# Patient Record
Sex: MALE | Race: WHITE | Employment: OTHER | URBAN - METROPOLITAN AREA
[De-identification: names, ages, dates, MRNs, and addresses within clinical notes are randomized per-mention and may not be internally consistent; named-entity substitution may affect disease eponyms.]

---

## 2018-03-21 ENCOUNTER — OFFICE VISIT (OUTPATIENT)
Dept: HEMATOLOGY/ONCOLOGY | Facility: HOSPITAL | Age: 63
End: 2018-03-21
Attending: INTERNAL MEDICINE
Payer: COMMERCIAL

## 2018-03-21 VITALS
TEMPERATURE: 99 F | WEIGHT: 238.38 LBS | HEART RATE: 91 BPM | DIASTOLIC BLOOD PRESSURE: 81 MMHG | RESPIRATION RATE: 18 BRPM | SYSTOLIC BLOOD PRESSURE: 135 MMHG | HEIGHT: 75 IN | BODY MASS INDEX: 29.64 KG/M2 | OXYGEN SATURATION: 95 %

## 2018-03-21 DIAGNOSIS — C78.7 SECONDARY MALIGNANT NEOPLASM OF LIVER (HCC): ICD-10-CM

## 2018-03-21 DIAGNOSIS — C20 MALIGNANT NEOPLASM OF RECTUM (HCC): ICD-10-CM

## 2018-03-21 LAB
ALBUMIN SERPL-MCNC: 3.8 G/DL (ref 3.5–4.8)
ALP LIVER SERPL-CCNC: 58 U/L (ref 45–117)
ALT SERPL-CCNC: 37 U/L (ref 17–63)
AST SERPL-CCNC: 27 U/L (ref 15–41)
BASOPHILS # BLD AUTO: 0.02 X10(3) UL (ref 0–0.1)
BASOPHILS NFR BLD AUTO: 0.3 %
BILIRUB SERPL-MCNC: 0.4 MG/DL (ref 0.1–2)
BUN BLD-MCNC: 20 MG/DL (ref 8–20)
CALCIUM BLD-MCNC: 8.6 MG/DL (ref 8.3–10.3)
CEA: 2.4 NG/ML (ref 0.5–5)
CHLORIDE: 110 MMOL/L (ref 101–111)
CO2: 25 MMOL/L (ref 22–32)
CREAT BLD-MCNC: 0.92 MG/DL (ref 0.7–1.3)
EOSINOPHIL # BLD AUTO: 0.33 X10(3) UL (ref 0–0.3)
EOSINOPHIL NFR BLD AUTO: 5.3 %
ERYTHROCYTE [DISTWIDTH] IN BLOOD BY AUTOMATED COUNT: 12.4 % (ref 11.5–16)
GLUCOSE BLD-MCNC: 108 MG/DL (ref 70–99)
HCT VFR BLD AUTO: 46.9 % (ref 37–53)
HGB BLD-MCNC: 15.8 G/DL (ref 13–17)
IMMATURE GRANULOCYTE COUNT: 0.02 X10(3) UL (ref 0–1)
IMMATURE GRANULOCYTE RATIO %: 0.3 %
LDH: 209 U/L (ref 84–249)
LYMPHOCYTES # BLD AUTO: 0.79 X10(3) UL (ref 0.9–4)
LYMPHOCYTES NFR BLD AUTO: 12.6 %
M PROTEIN MFR SERPL ELPH: 6.9 G/DL (ref 6.1–8.3)
MCH RBC QN AUTO: 30.5 PG (ref 27–33.2)
MCHC RBC AUTO-ENTMCNC: 33.7 G/DL (ref 31–37)
MCV RBC AUTO: 90.5 FL (ref 80–99)
MONOCYTES # BLD AUTO: 0.55 X10(3) UL (ref 0.1–1)
MONOCYTES NFR BLD AUTO: 8.8 %
NEUTROPHIL ABS PRELIM: 4.56 X10 (3) UL (ref 1.3–6.7)
NEUTROPHILS # BLD AUTO: 4.56 X10(3) UL (ref 1.3–6.7)
NEUTROPHILS NFR BLD AUTO: 72.7 %
PLATELET # BLD AUTO: 129 10(3)UL (ref 150–450)
POTASSIUM SERPL-SCNC: 4.1 MMOL/L (ref 3.6–5.1)
RBC # BLD AUTO: 5.18 X10(6)UL (ref 4.3–5.7)
RED CELL DISTRIBUTION WIDTH-SD: 41.1 FL (ref 35.1–46.3)
SODIUM SERPL-SCNC: 141 MMOL/L (ref 136–144)
WBC # BLD AUTO: 6.3 X10(3) UL (ref 4–13)

## 2018-03-21 PROCEDURE — 99213 OFFICE O/P EST LOW 20 MIN: CPT | Performed by: INTERNAL MEDICINE

## 2018-03-21 NOTE — PROGRESS NOTES
Patient here for 1.5 year follow up. Patient denies any pain or any new issues.     Education Record    Learner:  Patient    Disease / Diagnosis: rectal with liver mets    Barriers / Limitations:  None   Comments:    Method:  Discussion   Comments:    Gener

## 2018-03-21 NOTE — PROGRESS NOTES
Cancer Center Progress Note    Problem List:      Patient Active Problem List:     Malignant neoplasm of rectum [facility])     Secondary malignant neoplasm of liver ([facility] [address]     Laceration of finger     Phalanx, distal fracture of finger      Interim History:    H consistent with healing zoster. Heart: Regular rate and rhythm. Abdomen: Soft, non tender with good bowel sounds. Extremities: No edema. No calf tenderness. Lymphatics:  There is no palpable lymphadenopathy throughout in the cervical, supraclavicular,

## 2018-09-17 PROBLEM — Z85.048 PERSONAL HISTORY OF MALIGNANT NEOPLASM OF RECTUM, RECTOSIGMOID JUNCTION, AND ANUS: Status: ACTIVE | Noted: 2018-09-17

## 2018-09-17 PROBLEM — Z80.0 FAMILY HISTORY OF MALIGNANT NEOPLASM OF DIGESTIVE ORGAN: Status: ACTIVE | Noted: 2018-09-17

## 2020-03-05 ENCOUNTER — HOSPITAL ENCOUNTER (OUTPATIENT)
Age: 65
Discharge: HOME | End: 2020-03-05
Attending: FAMILY MEDICINE
Payer: MEDICARE

## 2020-03-05 ENCOUNTER — APPOINTMENT (OUTPATIENT)
Dept: CT IMAGING | Facility: HOSPITAL | Age: 65
End: 2020-03-05
Attending: NURSE PRACTITIONER
Payer: MEDICARE

## 2020-03-05 ENCOUNTER — HOSPITAL ENCOUNTER (OUTPATIENT)
Facility: HOSPITAL | Age: 65
Setting detail: OBSERVATION
Discharge: HOME | End: 2020-03-07
Attending: EMERGENCY MEDICINE | Admitting: HOSPITALIST
Payer: MEDICARE

## 2020-03-05 VITALS
DIASTOLIC BLOOD PRESSURE: 94 MMHG | SYSTOLIC BLOOD PRESSURE: 122 MMHG | TEMPERATURE: 98 F | HEART RATE: 102 BPM | RESPIRATION RATE: 18 BRPM | OXYGEN SATURATION: 98 %

## 2020-03-05 DIAGNOSIS — K52.9 GASTROENTERITIS: Primary | ICD-10-CM

## 2020-03-05 DIAGNOSIS — R11.2 NAUSEA VOMITING AND DIARRHEA: ICD-10-CM

## 2020-03-05 DIAGNOSIS — E87.6 HYPOKALEMIA: ICD-10-CM

## 2020-03-05 DIAGNOSIS — R19.7 NAUSEA VOMITING AND DIARRHEA: ICD-10-CM

## 2020-03-05 DIAGNOSIS — E86.0 DEHYDRATION: ICD-10-CM

## 2020-03-05 DIAGNOSIS — R19.7 DIARRHEA, UNSPECIFIED TYPE: Primary | ICD-10-CM

## 2020-03-05 PROBLEM — R73.9 HYPERGLYCEMIA: Status: ACTIVE | Noted: 2020-03-05

## 2020-03-05 PROBLEM — R79.89 AZOTEMIA: Status: ACTIVE | Noted: 2020-03-05

## 2020-03-05 PROBLEM — E87.1 HYPONATREMIA: Status: ACTIVE | Noted: 2020-03-05

## 2020-03-05 PROCEDURE — 81002 URINALYSIS NONAUTO W/O SCOPE: CPT | Performed by: FAMILY MEDICINE

## 2020-03-05 PROCEDURE — 96374 THER/PROPH/DIAG INJ IV PUSH: CPT

## 2020-03-05 PROCEDURE — 99220 INITIAL OBSERVATION CARE,LEVL III: CPT | Performed by: HOSPITALIST

## 2020-03-05 PROCEDURE — 74177 CT ABD & PELVIS W/CONTRAST: CPT | Performed by: NURSE PRACTITIONER

## 2020-03-05 PROCEDURE — 96361 HYDRATE IV INFUSION ADD-ON: CPT

## 2020-03-05 PROCEDURE — 99204 OFFICE O/P NEW MOD 45 MIN: CPT

## 2020-03-05 PROCEDURE — 80053 COMPREHEN METABOLIC PANEL: CPT | Performed by: FAMILY MEDICINE

## 2020-03-05 PROCEDURE — 85025 COMPLETE CBC W/AUTO DIFF WBC: CPT | Performed by: FAMILY MEDICINE

## 2020-03-05 PROCEDURE — 99215 OFFICE O/P EST HI 40 MIN: CPT

## 2020-03-05 RX ORDER — SODIUM CHLORIDE 9 MG/ML
INJECTION, SOLUTION INTRAVENOUS CONTINUOUS
Status: DISCONTINUED | OUTPATIENT
Start: 2020-03-05 | End: 2020-03-07

## 2020-03-05 RX ORDER — POTASSIUM CHLORIDE 14.9 MG/ML
20 INJECTION INTRAVENOUS ONCE
Status: COMPLETED | OUTPATIENT
Start: 2020-03-05 | End: 2020-03-06

## 2020-03-05 RX ORDER — ONDANSETRON 2 MG/ML
4 INJECTION INTRAMUSCULAR; INTRAVENOUS EVERY 6 HOURS PRN
Status: DISCONTINUED | OUTPATIENT
Start: 2020-03-05 | End: 2020-03-07

## 2020-03-05 RX ORDER — ONDANSETRON 2 MG/ML
4 INJECTION INTRAMUSCULAR; INTRAVENOUS ONCE
Status: DISCONTINUED | OUTPATIENT
Start: 2020-03-05 | End: 2020-03-07

## 2020-03-05 RX ORDER — SODIUM CHLORIDE 9 MG/ML
INJECTION, SOLUTION INTRAVENOUS ONCE
Status: COMPLETED | OUTPATIENT
Start: 2020-03-05 | End: 2020-03-05

## 2020-03-05 RX ORDER — ONDANSETRON 2 MG/ML
4 INJECTION INTRAMUSCULAR; INTRAVENOUS ONCE
Status: COMPLETED | OUTPATIENT
Start: 2020-03-05 | End: 2020-03-05

## 2020-03-05 RX ORDER — METOCLOPRAMIDE HYDROCHLORIDE 5 MG/ML
10 INJECTION INTRAMUSCULAR; INTRAVENOUS EVERY 8 HOURS PRN
Status: DISCONTINUED | OUTPATIENT
Start: 2020-03-05 | End: 2020-03-07

## 2020-03-05 RX ORDER — ONDANSETRON 2 MG/ML
4 INJECTION INTRAMUSCULAR; INTRAVENOUS EVERY 4 HOURS PRN
Status: DISCONTINUED | OUTPATIENT
Start: 2020-03-05 | End: 2020-03-05

## 2020-03-05 RX ORDER — SODIUM CHLORIDE 9 MG/ML
INJECTION, SOLUTION INTRAVENOUS CONTINUOUS
Status: ACTIVE | OUTPATIENT
Start: 2020-03-05 | End: 2020-03-05

## 2020-03-05 RX ORDER — ACETAMINOPHEN 325 MG/1
650 TABLET ORAL EVERY 6 HOURS PRN
Status: DISCONTINUED | OUTPATIENT
Start: 2020-03-05 | End: 2020-03-07

## 2020-03-05 RX ORDER — POTASSIUM CHLORIDE 20 MEQ/1
20 TABLET, EXTENDED RELEASE ORAL 2 TIMES DAILY
Qty: 10 TABLET | Refills: 0 | Status: SHIPPED | OUTPATIENT
Start: 2020-03-05 | End: 2020-03-10

## 2020-03-05 RX ORDER — MORPHINE SULFATE 4 MG/ML
4 INJECTION, SOLUTION INTRAMUSCULAR; INTRAVENOUS ONCE
Status: COMPLETED | OUTPATIENT
Start: 2020-03-05 | End: 2020-03-05

## 2020-03-05 NOTE — ED INITIAL ASSESSMENT (HPI)
Pt states history of colon CA 10 years ago. Clean diagnosis now. Wife had 2 days of diarrhea last week. Pt developed increased amount of liquid stool in his colostomy bag 2 days later. Has continued for last 5 days.   States even drinking water goes thr

## 2020-03-05 NOTE — ED PROVIDER NOTES
Patient Seen in: [facility]      History   Patient presents with:  Diarrhea    Stated Complaint: stomach pains/diarrhea    HPI  68-year-old gentleman with a significant past medical history of rectal CA status post abdominoperin 122/94   Pulse 102   Temp 97.6 °F (36.4 °C) (Temporal)   Resp 18   SpO2 98%         Physical Exam  Constitutional:       General: He is not in acute distress. Appearance: He is well-developed. HENT:      Head: Normocephalic and atraumatic.       Right Lymphocyte 0.7 (*)     All other components within normal limits   POCT URINALYSIS DIPSTICK - Abnormal; Notable for the following components:    Urine Clarity Slightly cloudy (*)     Protein urine 100 mg/dL (*)     All other components within normal limits

## 2020-03-05 NOTE — ED INITIAL ASSESSMENT (HPI)
Patient presents with c/o low potassium today. Patient states labs were drawn because he has had diarrhea for about 5 days now. States he had abdominal pain which is improving and slight nausea. + generalized weakness. Denies fevers at home.

## 2020-03-06 PROCEDURE — 99225 SUBSEQUENT OBSERVATION CARE: CPT | Performed by: HOSPITALIST

## 2020-03-06 RX ORDER — POTASSIUM CHLORIDE 14.9 MG/ML
20 INJECTION INTRAVENOUS ONCE
Status: DISCONTINUED | OUTPATIENT
Start: 2020-03-06 | End: 2020-03-06

## 2020-03-06 RX ORDER — ENOXAPARIN SODIUM 100 MG/ML
40 INJECTION SUBCUTANEOUS DAILY
Status: DISCONTINUED | OUTPATIENT
Start: 2020-03-06 | End: 2020-03-07

## 2020-03-06 RX ORDER — POTASSIUM CHLORIDE 20 MEQ/1
40 TABLET, EXTENDED RELEASE ORAL EVERY 4 HOURS
Status: COMPLETED | OUTPATIENT
Start: 2020-03-06 | End: 2020-03-06

## 2020-03-06 NOTE — CONSULTS
[facility]  Report of Inpatient Ostomy Consultation     Loyd Lugo Patient Status:  Observation    1955 MRN [id number]   [facility] 4NW-A 410/410-A Attending Louise Montero MD   Hosp Day # 0 PCP Princess Pires MD       REASON patient. Time Spent: 10 mins. Thank you.     Rosi Herrera RN  Wound/Ostomy care pager [address] [place] [address] [phone]  3/6/2020  3:25 PM

## 2020-03-06 NOTE — PLAN OF CARE
NURSING ADMISSION NOTE      Patient admitted via Cart  Oriented to room. Safety precautions initiated. Bed in low position. Call light in reach. Patient admitted. Call light within reach. Potassium infusing per order.  Patient complaints of diarrh

## 2020-03-06 NOTE — H&P
[facility]  History and Physical     Debe Bad Patient Status:  Observation    1955 MRN [id number]   [facility] 4NW-A Attending Rezno Reese MD   Hosp Day # 0 PCP Ayde Castillo MD     Chief Complaint: Nausea, vomiting, Prochlorperazine        UNKNOWN    Comment:Causes shaking    Medications:  No current facility-administered medications on file prior to encounter.    Potassium Chloride ER 20 MEQ Oral Tab CR, Take 1 tablet (20 mEq total) by mouth 2 (two) times daily for in [address] [place] [address] Rd. ASSESSMENT / PLAN:     1. Nausea, vomiting, diarrhea, likely gastroenteritis  1. Treat supportively with bowel rest, IVF, anti-emetics, pain control  2. F/u stool studies  3. CT a/p reviewed  2. Hypokalemia, due to above, replace  3.  Hyponatr

## 2020-03-06 NOTE — ED PROVIDER NOTES
Patient Seen in: [facility] Emergency Department      History   Patient presents with:  Abnormal Result    Stated Complaint: Potassium 2.9    HPI  76 yo male with history of colon cancer with colostomy placement 10 yrs ago presents with n/v/d x4 day 97.9 °F (36.6 °C) (Temporal)   Resp 23   Ht 188 cm (6' 2\")   Wt 99.8 kg   SpO2 94%   BMI 28.25 kg/m²         Physical Exam  Vitals signs and nursing note reviewed. Constitutional:       General: He is not in acute distress.      Appearance: He is well-de STOOL CULTURE(P)[[id number]]                                 In process                 SHIGATOXIN[[id number]]                                       In process                   Please view results for these tests on the individual orders.    STOOL CULTURE( small bowel loops and fat containing left inguinal hernia. Stable left lower quadrant colostomy. URINARY BLADDER:  No visible focal wall thickening, lesion, or calculus. PELVIC NODES:  No adenopathy. PELVIC ORGANS:  No visible mass.   Pelvic organs appro

## 2020-03-06 NOTE — PROGRESS NOTES
[facility]  Progress Note     Kareen Felton Patient Status:  Observation    1955 MRN [id number]   [facility] 4NW-A Attending Dorinda Erickson MD   Hosp Day # 0 PCP Katy Hdez MD     Chief Complaint: diarrhea    S: Patient deepak reviewed  4. Advance diet as tolerated  3. Hypokalemia, due to above, replace. Still very low  4. Hyponatremia, due to above, on IVF, monitor  5.  H/o rectal cancer with liver mets     Quality:  · DVT Prophylaxis: SCDs  · CODE status: Full  · Hoskins: no

## 2020-03-07 VITALS
BODY MASS INDEX: 29.28 KG/M2 | WEIGHT: 228.13 LBS | TEMPERATURE: 98 F | HEIGHT: 74 IN | DIASTOLIC BLOOD PRESSURE: 77 MMHG | RESPIRATION RATE: 18 BRPM | SYSTOLIC BLOOD PRESSURE: 123 MMHG | HEART RATE: 57 BPM | OXYGEN SATURATION: 95 %

## 2020-03-07 PROCEDURE — 99217 OBSERVATION CARE DISCHARGE: CPT | Performed by: HOSPITALIST

## 2020-03-07 NOTE — PLAN OF CARE
Pt AOX4 with no complaints of pain. Pt denies nausea and abd cramping. Pt reports output in colostomy has slowed down. Pt ordered from soft diet menu 3 hours ago and has tolerated well so far.  Potassium 2.8 replaced with 80mEq and increased to 3.4 and repl Progressing

## 2020-03-07 NOTE — PLAN OF CARE
Alert and oriented, continues to experience loose stools from colostomy, + for norovirus. Placed on contact plus isolation, continue with IV fluids. Has occasional nausea, medicated with Zofran . Tolerating full liquids fairly well, advanced diet to soft.

## 2020-03-07 NOTE — PLAN OF CARE
Pt. A&O x4. VSS. No c/o N/V today. States diarrhea is better than previous days. Isolation precautions maintained. Will continue to monitor. Pt. Dnaielle Latif to d/c per MD. Discharge instructions given. Pt. And pts. Wife verbalized understanding.  All belongings s

## 2020-03-08 NOTE — DISCHARGE SUMMARY
[facility]  DISCHARGE SUMMARY     Jose Coleman Patient Status:  Observation    1955 MRN [id number]   [facility] 4NW-A Attending No att. providers found   Hosp Day # 0 PCP Mani Caicedo MD     Date of Admission: 3/5/2020  Date food for others.      Procedures during hospitalization:   • none    Incidental or significant findings and recommendations (brief descriptions):  • none    Lab/Test results pending at Discharge:   · none    Consultants:  • none    Discharge Medication List

## 2020-08-29 ENCOUNTER — HOSPITAL ENCOUNTER (OUTPATIENT)
Age: 65
Discharge: HOME | End: 2020-08-29
Payer: MEDICARE

## 2020-08-29 VITALS
BODY MASS INDEX: 30.8 KG/M2 | OXYGEN SATURATION: 98 % | HEART RATE: 75 BPM | WEIGHT: 240 LBS | TEMPERATURE: 97 F | SYSTOLIC BLOOD PRESSURE: 133 MMHG | HEIGHT: 74 IN | DIASTOLIC BLOOD PRESSURE: 83 MMHG | RESPIRATION RATE: 20 BRPM

## 2020-08-29 DIAGNOSIS — H11.32 SUBCONJUNCTIVAL BLEED, LEFT: Primary | ICD-10-CM

## 2020-08-29 PROCEDURE — 99212 OFFICE O/P EST SF 10 MIN: CPT

## 2020-08-29 NOTE — ED PROVIDER NOTES
Patient Seen in: [facility] [place]      History   Patient presents with:   Eye Visual Problem    Stated Complaint: RED LEFT EYE    HPI  Patient is a 49-year-old male with past medical history of rectal CA, Liver CA 12 years ago presents Exam     ED Triage Vitals [08/29/20 0807]   /83   Pulse 75   Resp 20   Temp 97.1 °F (36.2 °C)   Temp src Temporal   SpO2 98 %   O2 Device None (Room air)       Current:/83   Pulse 75   Temp 97.1 °F (36.2 °C) (Temporal)   Resp 20   Ht 188 cm (6' Clinical Impression:  Subconjunctival bleed, left  (primary encounter diagnosis)    Disposition:  Discharge  8/29/2020  8:34 am    Follow-up:  MD Efren Remy. Ravindra Royal [address] [place] [address]  [phone]

## 2021-01-25 ENCOUNTER — HOSPITAL ENCOUNTER (EMERGENCY)
Facility: HOSPITAL | Age: 66
Discharge: HOME | End: 2021-01-25
Attending: EMERGENCY MEDICINE
Payer: MEDICARE

## 2021-01-25 ENCOUNTER — APPOINTMENT (OUTPATIENT)
Dept: CT IMAGING | Facility: HOSPITAL | Age: 66
End: 2021-01-25
Attending: EMERGENCY MEDICINE
Payer: MEDICARE

## 2021-01-25 VITALS
BODY MASS INDEX: 29.52 KG/M2 | WEIGHT: 230 LBS | HEART RATE: 75 BPM | SYSTOLIC BLOOD PRESSURE: 112 MMHG | DIASTOLIC BLOOD PRESSURE: 81 MMHG | HEIGHT: 74 IN | RESPIRATION RATE: 16 BRPM | TEMPERATURE: 98 F | OXYGEN SATURATION: 94 %

## 2021-01-25 DIAGNOSIS — E86.0 DEHYDRATION: ICD-10-CM

## 2021-01-25 DIAGNOSIS — N13.9 OBSTRUCTIVE UROPATHY: Primary | ICD-10-CM

## 2021-01-25 DIAGNOSIS — N17.9 AKI (ACUTE KIDNEY INJURY) (HCC): ICD-10-CM

## 2021-01-25 LAB
ALBUMIN SERPL-MCNC: 3.7 G/DL (ref 3.4–5)
ALBUMIN/GLOB SERPL: 1 {RATIO} (ref 1–2)
ALP LIVER SERPL-CCNC: 42 U/L
ALT SERPL-CCNC: 32 U/L
ANION GAP SERPL CALC-SCNC: 9 MMOL/L (ref 0–18)
AST SERPL-CCNC: 17 U/L (ref 15–37)
BASOPHILS # BLD AUTO: 0.01 X10(3) UL (ref 0–0.2)
BASOPHILS NFR BLD AUTO: 0.1 %
BILIRUB SERPL-MCNC: 0.8 MG/DL (ref 0.1–2)
BILIRUB UR QL STRIP.AUTO: NEGATIVE
BUN BLD-MCNC: 26 MG/DL (ref 7–18)
BUN/CREAT SERPL: 17.8 (ref 10–20)
CALCIUM BLD-MCNC: 8.9 MG/DL (ref 8.5–10.1)
CHLORIDE SERPL-SCNC: 107 MMOL/L (ref 98–112)
CLARITY UR REFRACT.AUTO: CLEAR
CO2 SERPL-SCNC: 21 MMOL/L (ref 21–32)
COLOR UR AUTO: YELLOW
CREAT BLD-MCNC: 1.46 MG/DL
DEPRECATED RDW RBC AUTO: 40.9 FL (ref 35.1–46.3)
EOSINOPHIL # BLD AUTO: 0.13 X10(3) UL (ref 0–0.7)
EOSINOPHIL NFR BLD AUTO: 1.6 %
ERYTHROCYTE [DISTWIDTH] IN BLOOD BY AUTOMATED COUNT: 12.5 % (ref 11–15)
GLOBULIN PLAS-MCNC: 3.6 G/DL (ref 2.8–4.4)
GLUCOSE BLD-MCNC: 196 MG/DL (ref 70–99)
GLUCOSE UR STRIP.AUTO-MCNC: NEGATIVE MG/DL
HCT VFR BLD AUTO: 51.3 %
HGB BLD-MCNC: 17 G/DL
IMM GRANULOCYTES # BLD AUTO: 0.03 X10(3) UL (ref 0–1)
IMM GRANULOCYTES NFR BLD: 0.4 %
KETONES UR STRIP.AUTO-MCNC: NEGATIVE MG/DL
LEUKOCYTE ESTERASE UR QL STRIP.AUTO: NEGATIVE
LIPASE SERPL-CCNC: 97 U/L (ref 73–393)
LYMPHOCYTES # BLD AUTO: 0.36 X10(3) UL (ref 1–4)
LYMPHOCYTES NFR BLD AUTO: 4.5 %
M PROTEIN MFR SERPL ELPH: 7.3 G/DL (ref 6.4–8.2)
MCH RBC QN AUTO: 29.8 PG (ref 26–34)
MCHC RBC AUTO-ENTMCNC: 33.1 G/DL (ref 31–37)
MCV RBC AUTO: 90 FL
MONOCYTES # BLD AUTO: 0.61 X10(3) UL (ref 0.1–1)
MONOCYTES NFR BLD AUTO: 7.6 %
NEUTROPHILS # BLD AUTO: 6.85 X10 (3) UL (ref 1.5–7.7)
NEUTROPHILS # BLD AUTO: 6.85 X10(3) UL (ref 1.5–7.7)
NEUTROPHILS NFR BLD AUTO: 85.8 %
NITRITE UR QL STRIP.AUTO: NEGATIVE
OSMOLALITY SERPL CALC.SUM OF ELEC: 294 MOSM/KG (ref 275–295)
PH UR STRIP.AUTO: 5 [PH] (ref 4.5–8)
PLATELET # BLD AUTO: 134 10(3)UL (ref 150–450)
POTASSIUM SERPL-SCNC: 3.6 MMOL/L (ref 3.5–5.1)
PROT UR STRIP.AUTO-MCNC: 30 MG/DL
RBC # BLD AUTO: 5.7 X10(6)UL
RBC #/AREA URNS AUTO: >10 /HPF
SODIUM SERPL-SCNC: 137 MMOL/L (ref 136–145)
SP GR UR STRIP.AUTO: <1.005 (ref 1–1.03)
UROBILINOGEN UR STRIP.AUTO-MCNC: <2 MG/DL
WBC # BLD AUTO: 8 X10(3) UL (ref 4–11)

## 2021-01-25 PROCEDURE — 99285 EMERGENCY DEPT VISIT HI MDM: CPT

## 2021-01-25 PROCEDURE — 96361 HYDRATE IV INFUSION ADD-ON: CPT

## 2021-01-25 PROCEDURE — 85025 COMPLETE CBC W/AUTO DIFF WBC: CPT | Performed by: EMERGENCY MEDICINE

## 2021-01-25 PROCEDURE — 74177 CT ABD & PELVIS W/CONTRAST: CPT | Performed by: EMERGENCY MEDICINE

## 2021-01-25 PROCEDURE — 83690 ASSAY OF LIPASE: CPT | Performed by: EMERGENCY MEDICINE

## 2021-01-25 PROCEDURE — 81001 URINALYSIS AUTO W/SCOPE: CPT | Performed by: EMERGENCY MEDICINE

## 2021-01-25 PROCEDURE — 96375 TX/PRO/DX INJ NEW DRUG ADDON: CPT

## 2021-01-25 PROCEDURE — 99284 EMERGENCY DEPT VISIT MOD MDM: CPT

## 2021-01-25 PROCEDURE — 96374 THER/PROPH/DIAG INJ IV PUSH: CPT

## 2021-01-25 PROCEDURE — 80053 COMPREHEN METABOLIC PANEL: CPT | Performed by: EMERGENCY MEDICINE

## 2021-01-25 RX ORDER — ONDANSETRON 2 MG/ML
4 INJECTION INTRAMUSCULAR; INTRAVENOUS ONCE
Status: COMPLETED | OUTPATIENT
Start: 2021-01-25 | End: 2021-01-25

## 2021-01-25 RX ORDER — TAMSULOSIN HYDROCHLORIDE 0.4 MG/1
0.4 CAPSULE ORAL DAILY
Qty: 14 CAPSULE | Refills: 0 | Status: SHIPPED | OUTPATIENT
Start: 2021-01-25 | End: 2021-02-08

## 2021-01-25 RX ORDER — HYDROCODONE BITARTRATE AND ACETAMINOPHEN 5; 325 MG/1; MG/1
1-2 TABLET ORAL EVERY 6 HOURS PRN
Qty: 16 TABLET | Refills: 0 | Status: SHIPPED | OUTPATIENT
Start: 2021-01-25 | End: 2021-02-01

## 2021-01-25 RX ORDER — MORPHINE SULFATE 4 MG/ML
4 INJECTION, SOLUTION INTRAMUSCULAR; INTRAVENOUS ONCE
Status: COMPLETED | OUTPATIENT
Start: 2021-01-25 | End: 2021-01-25

## 2021-01-25 NOTE — ED PROVIDER NOTES
Patient Seen in: [facility] Emergency Department      History   Patient presents with:  Abdomen/Flank Pain    Stated Complaint: diarrhea since thurs with abd pain    HPI/Subjective:   HPI    This is 64-year-old man, history of colon cancer with mets Current:/72   Pulse 75   Temp 97.8 °F (36.6 °C) (Temporal)   Resp 16   Ht 188 cm (6' 2\")   Wt 104.3 kg   SpO2 92%   BMI 29.53 kg/m²         Physical Exam        Physical Exam  Vitals signs and nursing note reviewed.    General: This is well-kenna view results for these tests on the individual orders.    RAINBOW DRAW BLUE   RAINBOW DRAW LAVENDER   RAINBOW DRAW LIGHT GREEN   RAINBOW DRAW GOLD          Ct Abdomen+pelvis(contrast Only)(cpt=74177)    Result Date: 1/25/2021  PROCEDURE:  CT ABDOMEN+PELVIS mm calculus in the proximal to mid right ureter is causing moderate obstruction.    Dictated by (CST): Emma Monroy MD on 1/25/2021 at 10:35 AM     Finalized by (CST): Emma Monroy MD on 1/25/2021 at 10:39 AM              MDM      This is a 75-y [address]  [phone]    Schedule an appointment as soon as possible for a visit in 1 day  This is a urologist.  Please call to schedule appointment to be reevaluated later on this week.           Medications Prescribed:  Current Discharge

## 2021-03-04 DIAGNOSIS — Z23 NEED FOR VACCINATION: ICD-10-CM

## 2021-03-16 ENCOUNTER — IMMUNIZATION (OUTPATIENT)
Dept: LAB | Age: 66
End: 2021-03-16
Attending: HOSPITALIST
Payer: MEDICARE

## 2021-03-16 DIAGNOSIS — Z23 NEED FOR VACCINATION: Primary | ICD-10-CM

## 2021-03-16 PROCEDURE — 0001A SARSCOV2 VAC 30MCG/0.3ML IM: CPT

## 2021-04-06 ENCOUNTER — IMMUNIZATION (OUTPATIENT)
Dept: LAB | Age: 66
End: 2021-04-06
Attending: HOSPITALIST
Payer: MEDICARE

## 2021-04-06 DIAGNOSIS — Z23 NEED FOR VACCINATION: Primary | ICD-10-CM

## 2021-04-06 PROCEDURE — 0002A SARSCOV2 VAC 30MCG/0.3ML IM: CPT

## 2021-09-17 ENCOUNTER — LAB ENCOUNTER (OUTPATIENT)
Dept: LAB | Age: 66
End: 2021-09-17
Attending: STUDENT IN AN ORGANIZED HEALTH CARE EDUCATION/TRAINING PROGRAM
Payer: MEDICARE

## 2021-09-17 DIAGNOSIS — Z01.818 PRE-OP TESTING: ICD-10-CM

## 2021-09-18 LAB — SARS-COV-2 RNA RESP QL NAA+PROBE: NOT DETECTED

## 2021-09-20 PROBLEM — R11.0 NAUSEA: Status: ACTIVE | Noted: 2021-09-20

## 2021-09-20 PROBLEM — Z85.048 HISTORY OF RECTAL OR ANAL CANCER: Status: ACTIVE | Noted: 2021-09-20

## 2021-09-20 PROBLEM — R19.4 CHANGE IN BOWEL HABITS: Status: ACTIVE | Noted: 2021-09-20

## 2021-09-20 PROBLEM — Z80.0 FAMILY HISTORY OF RECTAL CANCER: Status: ACTIVE | Noted: 2021-09-20

## 2021-09-20 PROBLEM — R19.7 DIARRHEA: Status: ACTIVE | Noted: 2021-09-20

## 2021-10-06 ENCOUNTER — IMMUNIZATION (OUTPATIENT)
Dept: LAB | Facility: HOSPITAL | Age: 66
End: 2021-10-06
Attending: EMERGENCY MEDICINE
Payer: MEDICARE

## 2021-10-06 DIAGNOSIS — Z23 NEED FOR VACCINATION: Primary | ICD-10-CM

## 2021-10-06 PROCEDURE — 0003A SARSCOV2 VAC 30MCG/0.3ML IM: CPT

## 2022-06-05 ENCOUNTER — OFFICE VISIT (OUTPATIENT)
Dept: FAMILY MEDICINE CLINIC | Facility: CLINIC | Age: 67
End: 2022-06-05
Payer: MEDICARE

## 2022-06-05 VITALS — TEMPERATURE: 98 F | HEART RATE: 74 BPM | OXYGEN SATURATION: 98 %

## 2022-06-05 DIAGNOSIS — R05.9 COUGH: Primary | ICD-10-CM

## 2022-06-05 NOTE — PATIENT INSTRUCTIONS
Your covid test will be back in 24-36 hours. Monitor your symptoms and use OTC as needed. Ibuprofen/Tylenol for fever/pain  Use Benadryl at bedtime to reduce drainage and promote rest.  Zyrtec/Claritin/Allegra in the AM to reduce nasal drainage without sedation. Use saline nasal sprays to reduce congestion and thin secretions. Use Delsym for cough. Consider applying janiya's vapo-rub or eucayptus oil to chest and feet at bedtime to reduce chest and nasal congestion. Warm tea with honey, cough lozenges, vaporizers/steam etc.    If no better in 2-3 days, follow-up for further evaluation with your PCP.

## 2022-06-06 LAB — SARS-COV-2 RNA RESP QL NAA+PROBE: NOT DETECTED

## 2023-03-13 ENCOUNTER — HOSPITAL ENCOUNTER (OUTPATIENT)
Age: 68
Discharge: HOME | End: 2023-03-13
Payer: MEDICARE

## 2023-03-13 VITALS
BODY MASS INDEX: 30 KG/M2 | TEMPERATURE: 98 F | OXYGEN SATURATION: 98 % | HEART RATE: 83 BPM | SYSTOLIC BLOOD PRESSURE: 154 MMHG | DIASTOLIC BLOOD PRESSURE: 73 MMHG | RESPIRATION RATE: 18 BRPM | WEIGHT: 235 LBS

## 2023-03-13 DIAGNOSIS — J01.00 ACUTE NON-RECURRENT MAXILLARY SINUSITIS: Primary | ICD-10-CM

## 2023-03-13 PROCEDURE — 99213 OFFICE O/P EST LOW 20 MIN: CPT

## 2023-03-13 PROCEDURE — 99214 OFFICE O/P EST MOD 30 MIN: CPT

## 2023-03-13 RX ORDER — AMOXICILLIN AND CLAVULANATE POTASSIUM 875; 125 MG/1; MG/1
1 TABLET, FILM COATED ORAL 2 TIMES DAILY
Qty: 20 TABLET | Refills: 0 | Status: SHIPPED | OUTPATIENT
Start: 2023-03-13 | End: 2023-03-23

## 2023-03-13 RX ORDER — PREDNISONE 20 MG/1
40 TABLET ORAL DAILY
Qty: 10 TABLET | Refills: 0 | Status: SHIPPED | OUTPATIENT
Start: 2023-03-13 | End: 2023-03-18

## 2023-03-13 NOTE — DISCHARGE INSTRUCTIONS
Take medications as directed. You may also use a decongestant. Close follow-up with your primary care doctor.

## 2024-03-04 ENCOUNTER — LAB ENCOUNTER (OUTPATIENT)
Dept: LAB | Age: 69
End: 2024-03-04
Attending: FAMILY MEDICINE
Payer: MEDICARE

## 2024-03-04 ENCOUNTER — OFFICE VISIT (OUTPATIENT)
Dept: FAMILY MEDICINE CLINIC | Facility: CLINIC | Age: 69
End: 2024-03-04
Payer: MEDICARE

## 2024-03-04 VITALS
RESPIRATION RATE: 16 BRPM | HEART RATE: 82 BPM | WEIGHT: 240 LBS | DIASTOLIC BLOOD PRESSURE: 72 MMHG | HEIGHT: 74 IN | BODY MASS INDEX: 30.8 KG/M2 | OXYGEN SATURATION: 98 % | SYSTOLIC BLOOD PRESSURE: 124 MMHG

## 2024-03-04 DIAGNOSIS — R09.89 RHONCHI: ICD-10-CM

## 2024-03-04 DIAGNOSIS — Z12.5 SCREENING FOR PROSTATE CANCER: ICD-10-CM

## 2024-03-04 DIAGNOSIS — Z85.048 PERSONAL HISTORY OF MALIGNANT NEOPLASM OF RECTUM, RECTOSIGMOID JUNCTION, AND ANUS: ICD-10-CM

## 2024-03-04 DIAGNOSIS — Z85.048 HISTORY OF RECTAL OR ANAL CANCER: ICD-10-CM

## 2024-03-04 DIAGNOSIS — K30 STOMACH UPSET: ICD-10-CM

## 2024-03-04 DIAGNOSIS — Z12.11 SCREENING FOR COLORECTAL CANCER: ICD-10-CM

## 2024-03-04 DIAGNOSIS — Z13.0 SCREENING FOR DEFICIENCY ANEMIA: ICD-10-CM

## 2024-03-04 DIAGNOSIS — Z00.00 ENCOUNTER FOR ANNUAL HEALTH EXAMINATION: ICD-10-CM

## 2024-03-04 DIAGNOSIS — Z13.1 SCREENING FOR DIABETES MELLITUS (DM): ICD-10-CM

## 2024-03-04 DIAGNOSIS — K52.9 CHRONIC DIARRHEA: ICD-10-CM

## 2024-03-04 DIAGNOSIS — Z00.00 ENCOUNTER FOR ANNUAL HEALTH EXAMINATION: Primary | ICD-10-CM

## 2024-03-04 DIAGNOSIS — Z13.6 SCREENING FOR CARDIOVASCULAR CONDITION: ICD-10-CM

## 2024-03-04 DIAGNOSIS — R73.9 HYPERGLYCEMIA: ICD-10-CM

## 2024-03-04 DIAGNOSIS — Z12.12 SCREENING FOR COLORECTAL CANCER: ICD-10-CM

## 2024-03-04 PROBLEM — R19.7 DIARRHEA: Status: RESOLVED | Noted: 2021-09-20 | Resolved: 2024-03-04

## 2024-03-04 PROBLEM — Z80.0 FAMILY HISTORY OF RECTAL CANCER: Status: RESOLVED | Noted: 2021-09-20 | Resolved: 2024-03-04

## 2024-03-04 PROBLEM — R11.0 NAUSEA: Status: RESOLVED | Noted: 2021-09-20 | Resolved: 2024-03-04

## 2024-03-04 PROBLEM — E86.0 DEHYDRATION: Status: RESOLVED | Noted: 2020-03-05 | Resolved: 2024-03-04

## 2024-03-04 PROBLEM — Z80.0 FAMILY HISTORY OF MALIGNANT NEOPLASM OF DIGESTIVE ORGAN: Status: RESOLVED | Noted: 2018-09-17 | Resolved: 2024-03-04

## 2024-03-04 PROBLEM — E87.1 HYPONATREMIA: Status: RESOLVED | Noted: 2020-03-05 | Resolved: 2024-03-04

## 2024-03-04 PROBLEM — E87.6 HYPOKALEMIA: Status: RESOLVED | Noted: 2020-03-05 | Resolved: 2024-03-04

## 2024-03-04 PROBLEM — R19.4 CHANGE IN BOWEL HABITS: Status: RESOLVED | Noted: 2021-09-20 | Resolved: 2024-03-04

## 2024-03-04 PROBLEM — R79.89 AZOTEMIA: Status: RESOLVED | Noted: 2020-03-05 | Resolved: 2024-03-04

## 2024-03-04 LAB
ALBUMIN SERPL-MCNC: 3.6 G/DL (ref 3.4–5)
ALBUMIN/GLOB SERPL: 1.3 {RATIO} (ref 1–2)
ALP LIVER SERPL-CCNC: 56 U/L
ALT SERPL-CCNC: 41 U/L
ANION GAP SERPL CALC-SCNC: 5 MMOL/L (ref 0–18)
AST SERPL-CCNC: 23 U/L (ref 15–37)
BASOPHILS # BLD AUTO: 0.02 X10(3) UL (ref 0–0.2)
BASOPHILS NFR BLD AUTO: 0.4 %
BILIRUB SERPL-MCNC: 0.5 MG/DL (ref 0.1–2)
BUN BLD-MCNC: 15 MG/DL (ref 9–23)
CALCIUM BLD-MCNC: 8.7 MG/DL (ref 8.5–10.1)
CHLORIDE SERPL-SCNC: 112 MMOL/L (ref 98–112)
CHOLEST SERPL-MCNC: 144 MG/DL (ref ?–200)
CO2 SERPL-SCNC: 26 MMOL/L (ref 21–32)
COMPLEXED PSA SERPL-MCNC: 0.4 NG/ML (ref ?–4)
CREAT BLD-MCNC: 0.72 MG/DL
EGFRCR SERPLBLD CKD-EPI 2021: 98 ML/MIN/1.73M2 (ref 60–?)
EOSINOPHIL # BLD AUTO: 0.26 X10(3) UL (ref 0–0.7)
EOSINOPHIL NFR BLD AUTO: 4.6 %
ERYTHROCYTE [DISTWIDTH] IN BLOOD BY AUTOMATED COUNT: 12.7 %
EST. AVERAGE GLUCOSE BLD GHB EST-MCNC: 148 MG/DL (ref 68–126)
FASTING PATIENT LIPID ANSWER: NO
FASTING STATUS PATIENT QL REPORTED: NO
GLOBULIN PLAS-MCNC: 2.7 G/DL (ref 2.8–4.4)
GLUCOSE BLD-MCNC: 128 MG/DL (ref 70–99)
HBA1C MFR BLD: 6.8 % (ref ?–5.7)
HCT VFR BLD AUTO: 44.5 %
HDLC SERPL-MCNC: 30 MG/DL (ref 40–59)
HGB BLD-MCNC: 15 G/DL
IMM GRANULOCYTES # BLD AUTO: 0.02 X10(3) UL (ref 0–1)
IMM GRANULOCYTES NFR BLD: 0.4 %
LDLC SERPL CALC-MCNC: 86 MG/DL (ref ?–100)
LYMPHOCYTES # BLD AUTO: 0.84 X10(3) UL (ref 1–4)
LYMPHOCYTES NFR BLD AUTO: 14.8 %
MCH RBC QN AUTO: 29.7 PG (ref 26–34)
MCHC RBC AUTO-ENTMCNC: 33.7 G/DL (ref 31–37)
MCV RBC AUTO: 88.1 FL
MONOCYTES # BLD AUTO: 0.67 X10(3) UL (ref 0.1–1)
MONOCYTES NFR BLD AUTO: 11.8 %
NEUTROPHILS # BLD AUTO: 3.86 X10 (3) UL (ref 1.5–7.7)
NEUTROPHILS # BLD AUTO: 3.86 X10(3) UL (ref 1.5–7.7)
NEUTROPHILS NFR BLD AUTO: 68 %
NONHDLC SERPL-MCNC: 114 MG/DL (ref ?–130)
OSMOLALITY SERPL CALC.SUM OF ELEC: 298 MOSM/KG (ref 275–295)
PLATELET # BLD AUTO: 109 10(3)UL (ref 150–450)
POTASSIUM SERPL-SCNC: 3.6 MMOL/L (ref 3.5–5.1)
PROT SERPL-MCNC: 6.3 G/DL (ref 6.4–8.2)
RBC # BLD AUTO: 5.05 X10(6)UL
SODIUM SERPL-SCNC: 143 MMOL/L (ref 136–145)
TRIGL SERPL-MCNC: 158 MG/DL (ref 30–149)
VLDLC SERPL CALC-MCNC: 25 MG/DL (ref 0–30)
WBC # BLD AUTO: 5.7 X10(3) UL (ref 4–11)

## 2024-03-04 PROCEDURE — 83036 HEMOGLOBIN GLYCOSYLATED A1C: CPT

## 2024-03-04 PROCEDURE — 85025 COMPLETE CBC W/AUTO DIFF WBC: CPT

## 2024-03-04 PROCEDURE — 36415 COLL VENOUS BLD VENIPUNCTURE: CPT

## 2024-03-04 PROCEDURE — 80061 LIPID PANEL: CPT

## 2024-03-04 PROCEDURE — 80053 COMPREHEN METABOLIC PANEL: CPT

## 2024-03-04 NOTE — PATIENT INSTRUCTIONS
Ramsey Sapp's SCREENING SCHEDULE   Tests on this list are recommended by your physician but may not be covered, or covered at this frequency, by your insurer.   Please check with your insurance carrier before scheduling to verify coverage.   PREVENTATIVE SERVICES FREQUENCY &  COVERAGE DETAILS LAST COMPLETION DATE   Diabetes Screening    Fasting Blood Sugar / Glucose    One screening every 12 months if never tested or if previously tested but not diagnosed with pre-diabetes   One screening every 6 months if diagnosed with pre-diabetes Lab Results   Component Value Date     (H) 01/25/2021        Cardiovascular Disease Screening    Lipid Panel  Cholesterol  Lipoprotein (HDL)  Triglycerides Covered every 5 years for all Medicare beneficiaries without apparent signs or symptoms of cardiovascular disease No results found for: \"CHOLEST\", \"HDL\", \"LDL\", \"LDLD\", \"LDLC\", \"TRIG\"      Electrocardiogram (EKG)   Covered if needed at Welcome to Medicare, and non-screening if indicated for medical reasons 03/06/2020      Ultrasound Screening for Abdominal Aortic Aneurysm (AAA) Covered once in a lifetime for one of the following risk factors   • Men who are 65-75 years old and have ever smoked   • Anyone with a family history -     Colorectal Cancer Screening  Covered for ages 50-85; only need ONE of the following:    Colonoscopy   Covered every 10 years    Covered every 2 years if patient is at high risk or previous colonoscopy was abnormal 09/20/2021    Health Maintenance   Topic Date Due   • Colorectal Cancer Screening  03/20/2022       Flexible Sigmoidoscopy   Covered every 4 years -    Fecal Occult Blood Test Covered annually -   Prostate Cancer Screening    Prostate-Specific Antigen (PSA) Annually No results found for: \"PSA\"  Health Maintenance   Topic Date Due   • PSA  Never done      Immunizations    Influenza Covered once per flu season  Please get every year -  No recommendations at this time    Pneumococcal Each  vaccine ([id number] & [id number]) covered once after 65 Prevnar 13: -    [id number]: -     Pneumococcal Vaccination(1 of 2 - PCV) Never done    Hepatitis B One screening covered for patients with certain risk factors   -  No recommendations at this time    Tetanus Toxoid Not covered by Medicare Part B unless medically necessary (cut with metal); may be covered with your pharmacy prescription benefits -    Tetanus, Diptheria and Pertusis TD and TDaP Not covered by Medicare Part B -  No recommendations at this time    Zoster Not covered by Medicare Part B; may be covered with your pharmacy  prescription benefits -  Zoster Vaccines(1 of 2) Never done

## 2024-03-04 NOTE — PROGRESS NOTES
Subjective:   Ramsey Sapp is a 68 year old male who presents for a Medicare Subsequent Annual Wellness visit (Pt already had Initial Annual Wellness) and scheduled follow up of multiple significant but stable problems.   Ramsey is somewhat worried about blood pressure, was a little high at  visit when ill with sinus infection.    Has gained a lot of weight in the past few years.    Had history of rectal cancer, and metastases o the liver and has been treated, with surgery and chemotherapy.    Had followed with Dr. Carnes, with the pandemic slowed down. Had colonoscopy in 2021.    Has history of ostomy.    Social:  ,  He and his wife at home  , works at home, consier himself semi-retired.  Watches his granddaughter regularly, she is 2 years old.  Does woodworking    Walks a lot, as his regular exercise    Was a marathoner when younger, quit running about 8 years ago.    Basketball ref for years.    Has 4 grown kids, 8 grandkids, half live in the area. Some in [place], and [place].    History/Other:   Fall Risk Assessment:   He has been screened for Falls and is High Risk. Fall Prevention information provided to patient in After Visit Summary.    Do you feel unsteady when standing or walking?: No  Do you worry about falling?: Yes  Have you fallen in the past year?: No     Cognitive Assessment:   He had a completely normal cognitive assessment - see flowsheet entries     Functional Ability/Status:   Ramsey Sapp has a completely normal functional assessment. See flowsheet for details.    Depression Screening (PHQ-2/PHQ-9): PHQ-2 SCORE: 0  , done 3/4/2024       Advanced Directives:   He does NOT have a Living Will. [Do you have a living will?: No]  He does NOT have a Power of  for Health Care. [Do you have a healthcare power of ?: No]  Discussed Advance Care Planning with patient (and family/surrogate if present). Standard forms made available to patient in After Visit  Summary.      Patient Active Problem List   Diagnosis    Malignant neoplasm of rectum (HCC)    Secondary malignant neoplasm of liver (HCC)    Personal history of malignant neoplasm of rectum, rectosigmoid junction, and anus    Hyperglycemia    History of rectal or anal cancer     Allergies:  He is allergic to prochlorperazine.    Current Medications:  No outpatient medications have been marked as taking for the 3/4/24 encounter (Office Visit) with Boone Horner MD.       Medical History:  He  has a past medical history of Calculus of kidney, Cancer (HCC) (2010), Family history of malignant neoplasm of digestive organ, Personal history of antineoplastic chemotherapy (2010), and Wears glasses (1982).  Surgical History:  He  has a past surgical history that includes other surgical history; Colectomy (N/A, 2010); appendectomy (1969); cholecystectomy (2009); colonoscopy (may 2017); part removal colon w end colostomy (2010); and tonsillectomy (1960).   Family History:  His family history includes Cancer in his self.  Social History:  He  reports that he has never smoked. He has never used smokeless tobacco. He reports that he does not currently use alcohol after a past usage of about 4.0 standard drinks of alcohol per week. He reports that he does not use drugs.    Tobacco:  He has never smoked tobacco.    CAGE Alcohol Screen:   CAGE screening score of 0 on 2/27/2024, showing low risk of alcohol abuse.      Patient Care Team:  Boone Horner MD as PCP - General (Family Medicine)    Review of Systems  Negative except some nocturia,    Objective:   Physical Exam  General Appearance:  Alert, cooperative, no distress, appears stated age   Head:  Normocephalic, without obvious abnormality, atraumatic   Eyes:  PERRL, conjunctiva/corneas clear, EOM's intact, both eyes   Ears:  Normal TM's and external ear canals, both ears   Nose: Nares normal, septum midline, mucosa normal, no drainage or sinus tenderness   Throat: Lips,  mucosa, and tongue normal; teeth and gums normal   Neck: Supple, symmetrical, trachea midline, no adenopathy, thyroid: not enlarged, symmetric, no tenderness/mass/nodules, no carotid bruit or JVD   Back:   Symmetric, no curvature, ROM normal, no CVA tenderness   Lungs:   Clear to auscultation bilaterally except light rhonchi at lower lungs respirations unlabored   Chest Wall:  No tenderness or deformity   Heart:  Regular rate and rhythm, S1, S2 normal, no murmur, rub or gallop   Abdomen:   Soft, non-tender, bowel sounds active all four quadrants,  no masses, no organomegaly, ostomy       Rectal: Ostomy in abdomen   Extremities: Extremities normal, atraumatic, no cyanosis or edema   Pulses: 2+ and symmetric   Skin: Skin color, texture, turgor normal, no rashes or lesions   Lymph nodes: Cervical, supraclavicular, and axillary nodes normal   Neurologic: Normal     /72   Pulse 82   Resp 16   Ht 6' 2\" (1.88 m)   Wt 240 lb (108.9 kg)   SpO2 98%   BMI 30.81 kg/m²  Estimated body mass index is 30.81 kg/m² as calculated from the following:    Height as of this encounter: 6' 2\" (1.88 m).    Weight as of this encounter: 240 lb (108.9 kg).    Medicare Hearing Assessment:   Hearing Screening    Screening Method: Finger Rub  Finger Rub Result: Pass                     Assessment & Plan:   Ramsey Sapp is a 68 year old male who presents for a Medicare Assessment.     1. Encounter for annual health examination (Primary)  -     PSA Total, Screen; Future; Expected date: 03/04/2024  -     Lipid Panel; Future; Expected date: 03/04/2024  -     Comp Metabolic Panel (14); Future; Expected date: 03/04/2024  -     Hemoglobin A1C; Future; Expected date: 03/04/2024  -     CBC With Differential With Platelet; Future; Expected date: 03/04/2024  2. Hyperglycemia -Noted historically, will check blood sugar average to see if ongoing issues  -     Comp Metabolic Panel (14); Future; Expected date: 03/04/2024  -     Hemoglobin A1C;  Future; Expected date: 03/04/2024  3. Personal history of malignant neoplasm of rectum, rectosigmoid junction, and anus - No known recurrence, will recommend re-establish with specialists for surveillance  -     Comp Metabolic Panel (14); Future; Expected date: 03/04/2024  -     CBC With Differential With Platelet; Future; Expected date: 03/04/2024  -     Gastro Referral - In Network  -     Oncology/Hematology Referral - [facility])  4. History of rectal or anal cancer= see above  -     Comp Metabolic Panel (14); Future; Expected date: 03/04/2024  -     CBC With Differential With Platelet; Future; Expected date: 03/04/2024  -     Oncology/Hematology Referral - [place])  5. Screening for deficiency anemia  -     CBC With Differential With Platelet; Future; Expected date: 03/04/2024  6. Screening for prostate cancer  -     PSA Total, Screen; Future; Expected date: 03/04/2024  7. Screening for cardiovascular condition  -     Lipid Panel; Future; Expected date: 03/04/2024  8. Screening for diabetes mellitus (DM)  -     Hemoglobin A1C; Future; Expected date: 03/04/2024  9. Screening for colorectal cancer  -     Gastro Referral - In Network  10. Stomach upset - Ongoing issue, daughter with similar issues and found to have H pylori  -     H. Pylori Stool Ag, EIA; Future; Expected date: 03/04/2024  11. Chronic diarrhea  -     H. Pylori Stool Ag, EIA; Future; Expected date: 03/04/2024  12. Rhonchi - Noted on exam, consider chest xray, but he wishes to follow with oncology first as may get scans    The patient indicates understanding of these issues and agrees to the plan.  Lab work ordered.  Reinforced healthy diet, lifestyle, and exercise.      No follow-ups on file.     Boone Horner MD, 3/4/2024     Supplementary Documentation:   General Health:  In the past six months, have you lost more than 10 pounds without trying?: 2 - No  Has your appetite been poor?: No  Type of Diet: Balanced  How does the  patient maintain a good energy level?: Daily Walks  How would you describe your daily physical activity?: Moderate  How would you describe your current health state?: Fair  How do you maintain positive mental well-being?: Social Interaction  On a scale of 0 to 10, with 0 being no pain and 10 being severe pain, what is your pain level?: 1 - (Mild)  In the past six months, have you experienced urine leakage?: 0-No  At any time do you feel concerned for the safety/well-being of yourself and/or your children, in your home or elsewhere?: No  Have you had any immunizations at another office such as Influenza, Hepatitis B, Tetanus, or Pneumococcal?: Yes          Ramsey Sapp's SCREENING SCHEDULE   Tests on this list are recommended by your physician but may not be covered, or covered at this frequency, by your insurer.   Please check with your insurance carrier before scheduling to verify coverage.   PREVENTATIVE SERVICES FREQUENCY &  COVERAGE DETAILS LAST COMPLETION DATE   Diabetes Screening    Fasting Blood Sugar / Glucose    One screening every 12 months if never tested or if previously tested but not diagnosed with pre-diabetes   One screening every 6 months if diagnosed with pre-diabetes Lab Results   Component Value Date     (H) 01/25/2021        Cardiovascular Disease Screening    Lipid Panel  Cholesterol  Lipoprotein (HDL)  Triglycerides Covered every 5 years for all Medicare beneficiaries without apparent signs or symptoms of cardiovascular disease No results found for: \"CHOLEST\", \"HDL\", \"LDL\", \"LDLD\", \"LDLC\", \"TRIG\"      Electrocardiogram (EKG)   Covered if needed at Welcome to Medicare, and non-screening if indicated for medical reasons 03/06/2020      Ultrasound Screening for Abdominal Aortic Aneurysm (AAA) Covered once in a lifetime for one of the following risk factors    Men who are 65-75 years old and have ever smoked    Anyone with a family history -     Colorectal Cancer Screening  Covered for  ages 50-85; only need ONE of the following:    Colonoscopy   Covered every 10 years    Covered every 2 years if patient is at high risk or previous colonoscopy was abnormal 09/20/2021    Health Maintenance   Topic Date Due    Colorectal Cancer Screening  03/20/2022       Flexible Sigmoidoscopy   Covered every 4 years -    Fecal Occult Blood Test Covered annually -   Prostate Cancer Screening    Prostate-Specific Antigen (PSA) Annually No results found for: \"PSA\"  Health Maintenance   Topic Date Due    PSA  Never done      Immunizations    Influenza Covered once per flu season  Please get every year 01/11/2024  No recommendations at this time    Pneumococcal Each vaccine (Jyxytgf63 & Yedyfwbix57) covered once after 65 Prevnar 13: -    [id number]: -     Pneumococcal Vaccination(1 of 2 - PCV) Never done    Hepatitis B One screening covered for patients with certain risk factors   -  No recommendations at this time    Tetanus Toxoid Not covered by Medicare Part B unless medically necessary (cut with metal); may be covered with your pharmacy prescription benefits -    Tetanus, Diptheria and Pertusis TD and TDaP Not covered by Medicare Part B -  No recommendations at this time    Zoster Not covered by Medicare Part B; may be covered with your pharmacy  prescription benefits -  Zoster Vaccines(1 of 2) Never done

## 2024-03-06 ENCOUNTER — LAB ENCOUNTER (OUTPATIENT)
Dept: LAB | Age: 69
End: 2024-03-06
Attending: FAMILY MEDICINE
Payer: MEDICARE

## 2024-03-06 DIAGNOSIS — K30 STOMACH UPSET: ICD-10-CM

## 2024-03-06 DIAGNOSIS — K52.9 CHRONIC DIARRHEA: ICD-10-CM

## 2024-03-06 PROCEDURE — 87338 HPYLORI STOOL AG IA: CPT

## 2024-03-14 LAB — H PYLORI AG STL QL IA: NEGATIVE

## 2024-04-20 ENCOUNTER — HOSPITAL ENCOUNTER (OUTPATIENT)
Age: 69
Discharge: HOME | End: 2024-04-20
Payer: MEDICARE

## 2024-04-20 ENCOUNTER — APPOINTMENT (OUTPATIENT)
Dept: GENERAL RADIOLOGY | Age: 69
End: 2024-04-20
Attending: NURSE PRACTITIONER
Payer: MEDICARE

## 2024-04-20 VITALS
TEMPERATURE: 97 F | BODY MASS INDEX: 30.48 KG/M2 | HEART RATE: 59 BPM | DIASTOLIC BLOOD PRESSURE: 80 MMHG | RESPIRATION RATE: 16 BRPM | HEIGHT: 73 IN | WEIGHT: 230 LBS | SYSTOLIC BLOOD PRESSURE: 146 MMHG | OXYGEN SATURATION: 96 %

## 2024-04-20 DIAGNOSIS — M54.16 LUMBAR RADICULOPATHY: Primary | ICD-10-CM

## 2024-04-20 LAB
BILIRUB UR QL STRIP: NEGATIVE
CLARITY UR: CLEAR
COLOR UR: YELLOW
GLUCOSE UR STRIP-MCNC: NEGATIVE MG/DL
HGB UR QL STRIP: NEGATIVE
KETONES UR STRIP-MCNC: NEGATIVE MG/DL
LEUKOCYTE ESTERASE UR QL STRIP: NEGATIVE
NITRITE UR QL STRIP: NEGATIVE
PH UR STRIP: 5.5 [PH]
PROT UR STRIP-MCNC: 30 MG/DL
SP GR UR STRIP: >=1.03
UROBILINOGEN UR STRIP-ACNC: <2 MG/DL

## 2024-04-20 PROCEDURE — 96372 THER/PROPH/DIAG INJ SC/IM: CPT

## 2024-04-20 PROCEDURE — 99214 OFFICE O/P EST MOD 30 MIN: CPT

## 2024-04-20 PROCEDURE — 72110 X-RAY EXAM L-2 SPINE 4/>VWS: CPT | Performed by: NURSE PRACTITIONER

## 2024-04-20 PROCEDURE — 81002 URINALYSIS NONAUTO W/O SCOPE: CPT

## 2024-04-20 RX ORDER — KETOROLAC TROMETHAMINE 30 MG/ML
60 INJECTION, SOLUTION INTRAMUSCULAR; INTRAVENOUS ONCE
Status: DISCONTINUED | OUTPATIENT
Start: 2024-04-20 | End: 2024-04-20

## 2024-04-20 RX ORDER — KETOROLAC TROMETHAMINE 30 MG/ML
30 INJECTION, SOLUTION INTRAMUSCULAR; INTRAVENOUS ONCE
Status: COMPLETED | OUTPATIENT
Start: 2024-04-20 | End: 2024-04-20

## 2024-04-20 RX ORDER — PREDNISONE 20 MG/1
20 TABLET ORAL 2 TIMES DAILY
Qty: 10 TABLET | Refills: 0 | Status: SHIPPED | OUTPATIENT
Start: 2024-04-20 | End: 2024-04-25

## 2024-04-20 RX ORDER — CYCLOBENZAPRINE HCL 10 MG
10 TABLET ORAL 3 TIMES DAILY PRN
Qty: 20 TABLET | Refills: 0 | Status: SHIPPED | OUTPATIENT
Start: 2024-04-20 | End: 2024-04-27

## 2024-04-20 NOTE — ED PROVIDER NOTES
Patient Seen in: [facility]      History     Chief Complaint   Patient presents with    Back Pain     Stated Complaint: Lower back pain    Subjective:   HPI    68-year-old male with history of colon cancer 20 years ago with an ostomy presents today with complaints of lower back pain for about 3 weeks.  Patient denies any injury to his back.  Patient states that in his past he was a marathon runner so has had some back pain in his past.  Patient states over the last 3 weeks he has had 2 injections of an anti-inflammatory and has been using a back brace the last week.  Patient states he ambulates with 1 walker.  Patient denies any loss of bladder function.  Patient states the pain radiates down the left thigh.    Objective:   Past Medical History:    Back problem    Calculus of kidney    Cancer (HCC)    rectal cancer, liver cancer    Family history of malignant neoplasm of digestive organ    Malignant neoplasm of rectum (HCC)    Personal history of antineoplastic chemotherapy    Prediabetes    Secondary malignant neoplasm of liver (HCC)    Visual impairment    Wears glasses    reading              Past Surgical History:   Procedure Laterality Date    Appendectomy  1969    removed    Cholecystectomy  2009    Colectomy N/A 2010    rectal cancer, [facility]    Colonoscopy  may 2017    Other surgical history      Rectal cancer    Part removal colon w end colostomy  2010    Tonsillectomy  1960                Social History     Socioeconomic History    Marital status:    Tobacco Use    Smoking status: Never    Smokeless tobacco: Never   Vaping Use    Vaping status: Never Used   Substance and Sexual Activity    Alcohol use: Yes     Alcohol/week: 4.0 standard drinks of alcohol     Types: 2 Cans of beer, 2 Shots of liquor per week     Comment: weekly    Drug use: No              Review of Systems   Constitutional: Negative.    HENT: Negative.     Eyes: Negative.    Respiratory: Negative.      Cardiovascular: Negative.    Gastrointestinal: Negative.    Endocrine: Negative.    Genitourinary: Negative.    Musculoskeletal:  Positive for back pain.   Skin: Negative.    Allergic/Immunologic: Negative.    Neurological: Negative.    Hematological: Negative.    Psychiatric/Behavioral: Negative.         Positive for stated complaint: Lower back pain  Other systems are as noted in HPI.  Constitutional and vital signs reviewed.      All other systems reviewed and negative except as noted above.    Physical Exam     ED Triage Vitals [04/20/24 0810]   /80   Pulse 59   Resp 16   Temp 97.1 °F (36.2 °C)   Temp src Temporal   SpO2 96 %   O2 Device None (Room air)       Current:/80   Pulse 59   Temp 97.1 °F (36.2 °C) (Temporal)   Resp 16   Ht 185.4 cm (6' 1\")   Wt 104.3 kg   SpO2 96%   BMI 30.34 kg/m²         Physical Exam  Vitals and nursing note reviewed.   Constitutional:       Appearance: Normal appearance. He is normal weight.   HENT:      Head: Normocephalic.      Right Ear: External ear normal.      Left Ear: External ear normal.   Eyes:      Extraocular Movements: Extraocular movements intact.      Conjunctiva/sclera: Conjunctivae normal.      Pupils: Pupils are equal, round, and reactive to light.   Pulmonary:      Effort: Pulmonary effort is normal.   Abdominal:      Comments: Ostomy bag in place.   Musculoskeletal:         General: Tenderness present.      Cervical back: Normal range of motion and neck supple.      Comments: Positive straight leg raise slightly on left side.  Paraspinal spasm noted to the left lumbar paraspinal region.  Patient is wearing a back brace and using 1 cane.   Skin:     General: Skin is warm.   Neurological:      General: No focal deficit present.      Mental Status: He is alert.   Psychiatric:         Mood and Affect: Mood normal.             ED Course     Labs Reviewed   [facility] POCT URINALYSIS DIPSTICK - Abnormal; Notable for the following components:       Result  Value    Protein urine 30 (*)     All other components within normal limits                      MDM      68-year-old male with history of colon cancer 20 years ago with an ostomy presents today with complaints of lower back pain for about 3 weeks.  Patient denies any injury to his back.  Patient states that in his past he was a marathon runner so has had some back pain in his past.  Patient states over the last 3 weeks he has had 2 injections of an anti-inflammatory and has been using a back brace the last week.  Patient states he ambulates with 1 walker.  Patient denies any loss of bladder function.  Patient states the pain radiates down the left thigh.  Vital signs: Please see EMR.  Physical exam: Please see exam.  Differential diagnosis sciatica, lumbar strain, lumbar radiculopathy.  Recent Results (from the past 24 hour(s))   POCT Urinalysis Dipstick    Collection Time: 04/20/24  8:22 AM   Result Value Ref Range    Urine Color Yellow Yellow    Urine Clarity Clear Clear    Specific Gravity, Urine >=1.030 1.005 - 1.030    PH, Urine 5.5 5.0 - 8.0    Protein urine 30 (A) Negative mg/dL    Glucose, Urine Negative Negative mg/dL    Ketone, Urine Negative Negative mg/dL    Bilirubin, Urine Negative Negative    Blood, Urine Negative Negative    Nitrite Urine Negative Negative    Urobilinogen urine <2.0 <2.0 mg/dL    Leukocyte esterase urine Negative Negative     XR LUMBAR SPINE (MIN 4 VIEWS) (CPT=72110)    Result Date: 4/20/2024  CONCLUSION:   There are 5 lumbar-type vertebral bodies which maintain normal height.  Grade 1 retrolisthesis of L2 on L3.  Moderate multilevel disc height loss and endplate degenerative change.  Moderate lower lumbar facet arthrosis without appreciable pars defects.  No destructive bone lesions.   LOCATION:  [facility]   Dictated by (CST): Mary Arndt MD on 4/20/2024 at 8:49 AM     Finalized by (CST): Mary Arndt MD on 4/20/2024 at 8:51 AM      Will diagnose with lumbar radiculopathy.  Will  prescribe prednisone and cyclobenzaprine.  Patient is to follow-up with primary care provider within 1 week.  If no improvement patient is to follow-up with orthopedics.  ED precautions given.  Note to Patient  The 21st Century Cures Act makes medical notes like these available to patients in the interest of transparency. However, be advised this is a medical document and is intended as [id number]-tqti communication; it is written in medical language and may appear blunt, direct, or contain abbreviations or verbiage that are unfamiliar. Medical documents are intended to carry relevant information, facts as evident, and the clinical opinion of the practitioner.     This report has been produced using speech recognition software, and may contain errors related to grammar, punctuation, spelling, words or phrases unrecognized or not translated appropriately to text; these errors may be referred to the dictating provider for further clarification and/or addendum as needed.                                     Medical Decision Making  68-year-old male with history of colon cancer 20 years ago with an ostomy presents today with complaints of lower back pain for about 3 weeks.  Patient denies any injury to his back.  Patient states that in his past he was a marathon runner so has had some back pain in his past.  Patient states over the last 3 weeks he has had 2 injections of an anti-inflammatory and has been using a back brace the last week.  Patient states he ambulates with 1 walker.  Patient denies any loss of bladder function.  Patient states the pain radiates down the left thigh.      Problems Addressed:  Lumbar radiculopathy: acute illness or injury    Amount and/or Complexity of Data Reviewed  Labs: ordered. Decision-making details documented in ED Course.  Radiology: ordered. Decision-making details documented in ED Course.  ECG/medicine tests: ordered. Decision-making details documented in ED  Course.    Risk  Prescription drug management.        Disposition and Plan     Clinical Impression:  1. Lumbar radiculopathy         Disposition:  Discharge  4/20/2024  8:57 am    Follow-up:  Boone Horner MD  [address] [place] [address]  [address]  [facility] [address]  [phone]    In 1 week      Boone La MD  [address]  [facility] [address]  [facility] [address] [phone]    In 1 week  If symptoms worsen          Medications Prescribed:  Current Discharge Medication List        START taking these medications    Details   predniSONE 20 MG Oral Tab Take 1 tablet (20 mg total) by mouth 2 (two) times daily for 5 days.  Qty: 10 tablet, Refills: 0      cyclobenzaprine 10 MG Oral Tab Take 1 tablet (10 mg total) by mouth 3 (three) times daily as needed for Muscle spasms.  Qty: 20 tablet, Refills: 0

## 2024-05-08 ENCOUNTER — ANESTHESIA EVENT (OUTPATIENT)
Dept: ENDOSCOPY | Facility: HOSPITAL | Age: 69
End: 2024-05-08
Payer: MEDICARE

## 2024-05-08 ENCOUNTER — HOSPITAL ENCOUNTER (OUTPATIENT)
Facility: HOSPITAL | Age: 69
Setting detail: HOSPITAL OUTPATIENT SURGERY
Discharge: HOME | End: 2024-05-08
Attending: INTERNAL MEDICINE | Admitting: INTERNAL MEDICINE
Payer: MEDICARE

## 2024-05-08 ENCOUNTER — ANESTHESIA (OUTPATIENT)
Dept: ENDOSCOPY | Facility: HOSPITAL | Age: 69
End: 2024-05-08
Payer: MEDICARE

## 2024-05-08 VITALS
HEIGHT: 74 IN | SYSTOLIC BLOOD PRESSURE: 128 MMHG | BODY MASS INDEX: 29.52 KG/M2 | OXYGEN SATURATION: 94 % | TEMPERATURE: 98 F | RESPIRATION RATE: 18 BRPM | DIASTOLIC BLOOD PRESSURE: 88 MMHG | WEIGHT: 230 LBS | HEART RATE: 80 BPM

## 2024-05-08 DIAGNOSIS — Z85.048 HISTORY OF RECTAL CANCER: ICD-10-CM

## 2024-05-08 PROCEDURE — 88305 TISSUE EXAM BY PATHOLOGIST: CPT | Performed by: INTERNAL MEDICINE

## 2024-05-08 PROCEDURE — 0DBL8ZX EXCISION OF TRANSVERSE COLON, VIA NATURAL OR ARTIFICIAL OPENING ENDOSCOPIC, DIAGNOSTIC: ICD-10-PCS | Performed by: INTERNAL MEDICINE

## 2024-05-08 PROCEDURE — 0D5E8ZZ DESTRUCTION OF LARGE INTESTINE, VIA NATURAL OR ARTIFICIAL OPENING ENDOSCOPIC: ICD-10-PCS | Performed by: INTERNAL MEDICINE

## 2024-05-08 PROCEDURE — 0DBK8ZX EXCISION OF ASCENDING COLON, VIA NATURAL OR ARTIFICIAL OPENING ENDOSCOPIC, DIAGNOSTIC: ICD-10-PCS | Performed by: INTERNAL MEDICINE

## 2024-05-08 DEVICE — REPLAY HEMOSTASIS CLIP, 11MM SPAN
Type: IMPLANTABLE DEVICE | Status: FUNCTIONAL
Brand: REPLAY

## 2024-05-08 RX ORDER — LIDOCAINE HYDROCHLORIDE 10 MG/ML
INJECTION, SOLUTION EPIDURAL; INFILTRATION; INTRACAUDAL; PERINEURAL AS NEEDED
Status: DISCONTINUED | OUTPATIENT
Start: 2024-05-08 | End: 2024-05-08 | Stop reason: SURG

## 2024-05-08 RX ORDER — SODIUM CHLORIDE, SODIUM LACTATE, POTASSIUM CHLORIDE, CALCIUM CHLORIDE 600; 310; 30; 20 MG/100ML; MG/100ML; MG/100ML; MG/100ML
INJECTION, SOLUTION INTRAVENOUS CONTINUOUS
OUTPATIENT
Start: 2024-05-08

## 2024-05-08 RX ORDER — NALOXONE HYDROCHLORIDE 0.4 MG/ML
0.08 INJECTION, SOLUTION INTRAMUSCULAR; INTRAVENOUS; SUBCUTANEOUS ONCE AS NEEDED
Status: DISCONTINUED | OUTPATIENT
Start: 2024-05-08 | End: 2024-05-08

## 2024-05-08 RX ORDER — SODIUM CHLORIDE, SODIUM LACTATE, POTASSIUM CHLORIDE, CALCIUM CHLORIDE 600; 310; 30; 20 MG/100ML; MG/100ML; MG/100ML; MG/100ML
INJECTION, SOLUTION INTRAVENOUS CONTINUOUS
Status: DISCONTINUED | OUTPATIENT
Start: 2024-05-08 | End: 2024-05-08

## 2024-05-08 RX ORDER — NALOXONE HYDROCHLORIDE 0.4 MG/ML
0.08 INJECTION, SOLUTION INTRAMUSCULAR; INTRAVENOUS; SUBCUTANEOUS ONCE AS NEEDED
OUTPATIENT
Start: 2024-05-08 | End: 2024-05-08

## 2024-05-08 RX ADMIN — LIDOCAINE HYDROCHLORIDE 50 MG: 10 INJECTION, SOLUTION EPIDURAL; INFILTRATION; INTRACAUDAL; PERINEURAL at 10:25:00

## 2024-05-08 RX ADMIN — SODIUM CHLORIDE, SODIUM LACTATE, POTASSIUM CHLORIDE, CALCIUM CHLORIDE: 600; 310; 30; 20 INJECTION, SOLUTION INTRAVENOUS at 11:20:00

## 2024-05-08 RX ADMIN — SODIUM CHLORIDE, SODIUM LACTATE, POTASSIUM CHLORIDE, CALCIUM CHLORIDE: 600; 310; 30; 20 INJECTION, SOLUTION INTRAVENOUS at 10:25:00

## 2024-05-08 NOTE — ANESTHESIA PREPROCEDURE EVALUATION
PRE-OP EVALUATION    Patient Name: Ramsey Sapp    Admit Diagnosis: History of rectal cancer [Z85.048]    Pre-op Diagnosis: History of rectal cancer [Z85.048]    COLONOSCOPY    Anesthesia Procedure: COLONOSCOPY    Surgeons and Role:     * Adis Diaz MD - Primary    Pre-op vitals reviewed.        Body mass index is 29.53 kg/m².    Current medications reviewed.  Hospital Medications:   lactated ringers infusion   Intravenous Continuous       Outpatient Medications:     Medications Prior to Admission   Medication Sig Dispense Refill Last Dose    [] predniSONE 20 MG Oral Tab Take 1 tablet (20 mg total) by mouth 2 (two) times daily for 5 days. 10 tablet 0     [] cyclobenzaprine 10 MG Oral Tab Take 1 tablet (10 mg total) by mouth 3 (three) times daily as needed for Muscle spasms. 20 tablet 0     PEG 3350-KCl-Na Bicarb-NaCl 420 g Oral Recon Soln Take as directed by physician. (Patient not taking: Reported on 2024) 4000 mL 0 not started yet       Allergies: Prochlorperazine      Anesthesia Evaluation    Patient summary reviewed.    Anesthetic Complications  (-) history of anesthetic complications         GI/Hepatic/Renal                                 Cardiovascular      ECG reviewed.  Exercise tolerance: good     MET: >4                                           Endo/Other                                  Pulmonary                 (-) recent URI          Neuro/Psych                              Rectal ca        Past Surgical History:   Procedure Laterality Date    Appendectomy  1969    removed    Cholecystectomy  2009    Colectomy N/A 2010    rectal cancer, [facility]    Colonoscopy  may 2017    Other surgical history      Rectal cancer    Part removal colon w end colostomy  2010    Tonsillectomy  1960     Social History     Socioeconomic History    Marital status:    Tobacco Use    Smoking status: Never    Smokeless tobacco: Never   Vaping Use    Vaping status: Never  Used   Substance and Sexual Activity    Alcohol use: Yes     Alcohol/week: 4.0 standard drinks of alcohol     Types: 2 Cans of beer, 2 Shots of liquor per week     Comment: weekly    Drug use: No     History   Drug Use No     Available pre-op labs reviewed.  Lab Results   Component Value Date    WBC 5.7 03/04/2024    RBC 5.05 03/04/2024    HGB 15.0 03/04/2024    HCT 44.5 03/04/2024    MCV 88.1 03/04/2024    MCH 29.7 03/04/2024    MCHC 33.7 03/04/2024    RDW 12.7 03/04/2024    .0 (L) 03/04/2024     Lab Results   Component Value Date     03/04/2024    K 3.6 03/04/2024     03/04/2024    CO2 26.0 03/04/2024    BUN 15 03/04/2024    CREATSERUM 0.72 03/04/2024     (H) 03/04/2024    CA 8.7 03/04/2024            Airway      Mallampati: II  Mouth opening: >3 FB  TM distance: > 6 cm  Neck ROM: full Cardiovascular      Rhythm: regular  Rate: normal     Dental             Pulmonary      Breath sounds clear to auscultation bilaterally.               Other findings              ASA: 2   Plan: MAC  NPO status verified and patient meets guidelines.    Post-procedure pain management plan discussed with surgeon and patient.      Plan/risks discussed with: patient                Present on Admission:  **None**

## 2024-05-08 NOTE — ANESTHESIA POSTPROCEDURE EVALUATION
[facility]    Ramsey Sapp Patient Status:  Hospital Outpatient Surgery   Age/Gender 69 year old male MRN [id number]   Location [facility] ENDOSCOPY PAIN CENTER Attending Adis Diaz MD   Hosp Day # 0 PCP Boone Horner MD       Anesthesia Post-op Note    COLONOSCOPY WITH COLD SNARE POLYPECTOMY    Procedure Summary       Date: 05/08/24 Room / Location:  ENDOSCOPY 02 / [facility] ENDOSCOPY    Anesthesia Start: 1019 Anesthesia Stop: 1120    Procedure: COLONOSCOPY WITH COLD SNARE POLYPECTOMY Diagnosis:       History of rectal cancer      (polyps,)    Surgeons: Adis Diaz MD Anesthesiologist: Esequiel Jay MD    Anesthesia Type: MAC ASA Status: 2            Anesthesia Type: MAC    Vitals Value Taken Time   /74 05/08/24 1120   Temp  05/08/24 1122   Pulse 87 05/08/24 1121   Resp 18 05/08/24 1122   SpO2 97 % 05/08/24 1121   Vitals shown include unfiled device data.    Patient Location: Endoscopy    Anesthesia Type: MAC    Airway Patency: patent    Postop Pain Control: adequate    Mental Status: mildly sedated but able to meaningfully participate in the post-anesthesia evaluation    Nausea/Vomiting: none    Cardiopulmonary/Hydration status: stable euvolemic    Complications: no apparent anesthesia related complications    Postop vital signs: stable    Dental Exam: Unchanged from Preop    Patient to be discharged home when criteria met.

## 2024-05-08 NOTE — H&P
[facility]  Pre-op H and P    Ramsey Sapp Patient Status:  Hospital Outpatient Surgery    1955 MRN [id number]   Location [facility] ENDOSCOPY PAIN CENTER Attending Adis Diaz MD   Date 2024 PCP Boone Horner MD     CC: History of rectal cancer, Family history of rectal cancer (Sister).    History of Present Illness:  Ramsey Sapp is a a(n) 69 year old male. History of rectal cancer, Family history of rectal cancer (Sister).    History:  Past Medical History:    Back problem    Calculus of kidney    Cancer (HCC)    rectal cancer, liver cancer    Family history of malignant neoplasm of digestive organ    Malignant neoplasm of rectum (HCC)    Personal history of antineoplastic chemotherapy    Prediabetes    Secondary malignant neoplasm of liver (HCC)    Visual impairment    Wears glasses    reading     Past Surgical History:   Procedure Laterality Date    Appendectomy  1969    removed    Cholecystectomy      Colectomy N/A     rectal cancer, [facility]    Colonoscopy  may 2017    Other surgical history      Rectal cancer    Part removal colon w end colostomy      Tonsillectomy  1960     Family History   Problem Relation Age of Onset    Cancer Self     Heart Disease Neg     Stroke Neg       reports that he has never smoked. He has never used smokeless tobacco. He reports current alcohol use of about 4.0 standard drinks of alcohol per week. He reports that he does not use drugs.    Allergies:  Allergies   Allergen Reactions    Prochlorperazine OTHER (SEE COMMENTS)     Causes shaking       Medications:    Current Facility-Administered Medications:     lactated ringers infusion, , Intravenous, Continuous    Physical Exam:    Blood pressure 151/88, pulse 79, temperature 98.1 °F (36.7 °C), temperature source Tympanic, resp. rate 16, height 6' 2\" (1.88 m), weight 230 lb (104.3 kg), SpO2 95%.    General: Appears alert, oriented x3 and in no acute distress.  CV:  Normal rate   Lungs: Normal effort   Skin: Warm and dry.  Laboratory Data:           Assessment/Plan/Procedure:  Patient Active Problem List   Diagnosis    Personal history of malignant neoplasm of rectum, rectosigmoid junction, and anus    Hyperglycemia    History of rectal or anal cancer       History of rectal cancer, Family history of rectal cancer (Sister).    Plan:  Colonoscopy through stoma    Adis Diaz MD  5/8/2024  9:38 AM

## 2024-05-08 NOTE — DISCHARGE INSTRUCTIONS
Home Care Instructions for Colonoscopy with Sedation    Diet:  - Resume your regular diet as tolerated unless otherwise instructed.  - Start with light meals to minimize bloating.  - Do not drink alcohol today.    Medication:  - If you have questions about resuming your normal medications, please contact your Primary Care Physician.    Activities:  - Take it easy today. Do not return to work today.  - Do not drive today.  - Do not operate any machinery today (including kitchen equipment).    Colonoscopy:  - You may notice some rectal \"spotting\" (a little blood on the toilet tissue) for a day or two after the exam. This is normal.  - If you experience any rectal bleeding (not spotting), persistent tenderness or sharp severe abdominal pains, oral temperature over 100 degrees Fahrenheit, light-headedness or dizziness, or any other problems, contact your doctor.      **If unable to reach your doctor, please go to the [facility] Emergency Room**    - Your referring physician will receive a full report of your examination.  - If you do not hear from your doctor's office within two weeks of your biopsy, please call them for your results.    You may be able to see your laboratory results in [employer] between 4 and 7 business days.  In some cases, your physician may not have viewed the results before they are released to [employer].  If you have questions regarding your results contact the physician who ordered the test/exam by phone or via [employer] by choosing \"Ask a Medical Question.\"

## 2024-05-08 NOTE — OPERATIVE REPORT
Ramsey Sapp Patient Status:  [facility] Outpatient Surgery    1955 MRN [id number]   [facility] ENDOSCOPY PAIN CENTER Attending Adis Diaz MD   Date 2024 PCP Boone Horner MD     PREOPERATIVE DIAGNOSIS/INDICATION: H/o colorectal cancer  POSTOPERTATIVE DIAGNOSIS: polyps, granulation tissue at stoma  PROCEDURE PERFORMED: COLONOSCOPY with snare polypectomy, treatment of granulation tissue with silver nitrate  SEDATION: MAC sedation provided by General Anesthesia    TIME OUT WAS PERFORMED    INFORMED CONSENT: Risks, benefits and alternatives to the procedure were explained to the patient including but not limited to bleeding, infection, perforation, adverse drug reactions, pancreatitis and the need for hospitalization and surgery if this occurs, the patient understands and agrees to procedure.  PROCEDURE DESCRIPTION: After careful digital stoma examination a pediatric colonoscope was introduced into the patients stoma into the descending colon, splenic flexure, transverse colon, hepatic flexure, ascending colon, cecum, confirmed by landmarks, including the appendiceal orifice and ileocecal valve. Careful examination of the above described areas was performed on withdrawal of the endoscope. Retroflexion was performed on the rectum. The patient tolerated the procedure well, there were no immediate complication immediately following the procedure, and the patient was transferred to recovery in stable condition.  QUALITY OF PREPARATION: [place] Bowel Preparation Scale:            -      Right colon 3, Transverse colon 3, Left colon 3   FINDINGS/THERAPEUTICS:  COLON: 6 mm sessile ascending colon polyp s/p cold snare polypectomy, 10 mm sessile transverse colon polyp s/p cold snare polypectomy, two endoclips were placed for prophylaxis, two 5-6 mm sessile transverse colon polyp s/p cold snare polypectomy  Granulation tissue at Stoma treated with silver nitrate sticks  RECOMMENDATIONS:    Post Colonoscopy/polypectomy precautions, watch for bleeding, infection, perforation, adverse drug reactions   Follow biopsies.  Repeat colonoscopy in 3 years at [facility]    Adis Diaz MD  5/8/2024  11:16 AM

## 2024-05-10 NOTE — PROGRESS NOTES
Date: 2024    To: Ramsey Villar Sekou  : 1955    I hope this letter finds you doing well.  I am writing to inform you of the following:       The biopsies obtained from your recent colonoscopy indicate that the polyps were adenomas which, although benign (no evidence of cancer), are considered potentially pre-cancerous if they are left alone for many years.  They were removed at the time of your colonoscopy.         I am advising you to undergo repeat colonoscopy in 3 years. We will send you a reminder card when the time of your procedure is near.      Please call the office at [phone] if there are any questions.    Regards,    Adis Diaz M.D.

## 2024-07-19 ENCOUNTER — OFFICE VISIT (OUTPATIENT)
Dept: FAMILY MEDICINE CLINIC | Facility: CLINIC | Age: 69
End: 2024-07-19
Payer: MEDICARE

## 2024-07-19 ENCOUNTER — LAB ENCOUNTER (OUTPATIENT)
Dept: LAB | Age: 69
End: 2024-07-19
Attending: FAMILY MEDICINE
Payer: MEDICARE

## 2024-07-19 VITALS
TEMPERATURE: 98 F | SYSTOLIC BLOOD PRESSURE: 120 MMHG | BODY MASS INDEX: 27.98 KG/M2 | HEIGHT: 74 IN | WEIGHT: 218 LBS | RESPIRATION RATE: 16 BRPM | OXYGEN SATURATION: 98 % | HEART RATE: 86 BPM | DIASTOLIC BLOOD PRESSURE: 76 MMHG

## 2024-07-19 DIAGNOSIS — R73.09 ELEVATED GLUCOSE: ICD-10-CM

## 2024-07-19 DIAGNOSIS — K52.9 CHRONIC DIARRHEA: ICD-10-CM

## 2024-07-19 DIAGNOSIS — R53.83 OTHER FATIGUE: ICD-10-CM

## 2024-07-19 DIAGNOSIS — K52.9 CHRONIC DIARRHEA: Primary | ICD-10-CM

## 2024-07-19 DIAGNOSIS — R05.3 PERSISTENT COUGH: ICD-10-CM

## 2024-07-19 LAB
ANION GAP SERPL CALC-SCNC: 10 MMOL/L (ref 0–18)
BUN BLD-MCNC: 18 MG/DL (ref 9–23)
CALCIUM BLD-MCNC: 9.8 MG/DL (ref 8.7–10.4)
CHLORIDE SERPL-SCNC: 102 MMOL/L (ref 98–112)
CO2 SERPL-SCNC: 26 MMOL/L (ref 21–32)
CREAT BLD-MCNC: 1.19 MG/DL
EGFRCR SERPLBLD CKD-EPI 2021: 65 ML/MIN/1.73M2 (ref 60–?)
EST. AVERAGE GLUCOSE BLD GHB EST-MCNC: 143 MG/DL (ref 68–126)
FASTING STATUS PATIENT QL REPORTED: YES
GLUCOSE BLD-MCNC: 123 MG/DL (ref 70–99)
HBA1C MFR BLD: 6.6 % (ref ?–5.7)
OSMOLALITY SERPL CALC.SUM OF ELEC: 289 MOSM/KG (ref 275–295)
POTASSIUM SERPL-SCNC: 3.6 MMOL/L (ref 3.5–5.1)
SODIUM SERPL-SCNC: 138 MMOL/L (ref 136–145)

## 2024-07-19 PROCEDURE — 99213 OFFICE O/P EST LOW 20 MIN: CPT | Performed by: FAMILY MEDICINE

## 2024-07-19 PROCEDURE — 83036 HEMOGLOBIN GLYCOSYLATED A1C: CPT

## 2024-07-19 PROCEDURE — 80048 BASIC METABOLIC PNL TOTAL CA: CPT

## 2024-07-19 PROCEDURE — 36415 COLL VENOUS BLD VENIPUNCTURE: CPT

## 2024-07-19 RX ORDER — CHOLESTYRAMINE LIGHT 4 G/5.7G
4 POWDER, FOR SUSPENSION ORAL DAILY
Qty: 60 EACH | Refills: 1 | Status: SHIPPED | OUTPATIENT
Start: 2024-07-19

## 2024-07-19 RX ORDER — BENZONATATE 200 MG/1
200 CAPSULE ORAL 3 TIMES DAILY PRN
Qty: 30 CAPSULE | Refills: 0 | Status: SHIPPED | OUTPATIENT
Start: 2024-07-19

## 2024-07-19 NOTE — PROGRESS NOTES
[facility] Progress Note    SUBJECTIVE: Ramsey Sapp 69 year old male is here today for   Chief Complaint   Patient presents with    Diarrhea     Chronic diarrhea getting worse       Feeling low energy, last week, diarrhea has been worse, full bags, almost pure water. Instead of 4-5 going on for 8-10    Has an ongoing cough and cold symptoms, mostly improved but cough ongoing.    PMH  Past Medical History:    Back problem    Calculus of kidney    Cancer (HCC)    rectal cancer, liver cancer    Family history of malignant neoplasm of digestive organ    Malignant neoplasm of rectum (HCC)    Personal history of antineoplastic chemotherapy    Prediabetes    Secondary malignant neoplasm of liver (HCC)    Visual impairment    Wears glasses    reading        PSH  Past Surgical History:   Procedure Laterality Date    Appendectomy  1969    removed    Cholecystectomy  2009    Colectomy N/A 2010    rectal cancer, [facility]    Colonoscopy  may 2017    Colonoscopy N/A 5/8/2024    Procedure: COLONOSCOPY WITH COLD SNARE POLYPECTOMY;  Surgeon: Adis Diaz MD;  Location:  ENDOSCOPY    Other surgical history      Rectal cancer    Part removal colon w end colostomy  2010    Tonsillectomy  1960        Social Hx:  No changes    ROS  See HPI  OBJECTIVE:  /76   Pulse 86   Temp 97.8 °F (36.6 °C) (Oral)   Resp 16   Ht 6' 2\" (1.88 m)   Wt 218 lb (98.9 kg)   SpO2 98%   BMI 27.99 kg/m²     Resp: clear to auscultation bilaterally      Labs:          Meds:   No current outpatient medications on file.         Assessment/Plan  There are no diagnoses linked to this encounter.     Will plan to treat cough symptomatically while it improves    Will trial medication to help with loose stools to let me know if not improving         Total Time spent with patient and coordinating care:  15 minutes.    Follow up: as needed for well care      Boone Horner MD

## (undated) DEVICE — KIT CUSTOM ENDOPROCEDURE STERIS

## (undated) DEVICE — 1200CC GUARDIAN II: Brand: GUARDIAN

## (undated) DEVICE — TRAP POLYP W/ 2 SPEC TY CLR MAGNIFYING WIND

## (undated) DEVICE — LASSO POLYPECTOMY SNARE: Brand: LASSO

## (undated) DEVICE — KIT VLV 5 PC AIR H2O SUCT BX ENDOGATOR CONN

## (undated) DEVICE — 3M™ RED DOT™ MONITORING ELECTRODE WITH FOAM TAPE AND STICKY GEL, 50/BAG, 20/CASE, 72/PLT 2570: Brand: RED DOT™

## (undated) DEVICE — 10FT COMBINED O2 DELIVERY/CO2 MONITORING. FILTER WITH MICROSTREAM TYPE LUER: Brand: DUAL ADULT NASAL CANNULA

## (undated) NOTE — LETTER
[address]  [address]  Authorization for Surgical Operation and Procedure     Date:___________                                                                                                         Time:__________  I hereby authorize Surgeon(s):  Adis Diaz MD, my physician and his/her assistants (if applicable), which may include medical students, residents, and/or fellows, to perform the following surgical operation/ procedure and administer such anesthesia as may be determined necessary by my physician:  Operation/Procedure name (s) Procedure(s):  COLONOSCOPY on Ramsey Sapp   2.   I recognize that during the surgical operation/procedure, unforeseen conditions may necessitate additional or different procedures than those listed above.  I, therefore, further authorize and request that the above-named surgeon, assistants, or designees perform such procedures as are, in their judgment, necessary and desirable.    3.   My surgeon/physician has discussed prior to my surgery the potential benefits, risks and side effects of this procedure; the likelihood of achieving goals; and potential problems that might occur during recuperation.  They also discussed reasonable alternatives to the procedure, including risks, benefits, and side effects related to the alternatives and risks related to not receiving this procedure.  I have had all my questions answered and I acknowledge that no guarantee has been made as to the result that may be obtained.    4.   Should the need arise during my operation/procedure, which includes change of level of care prior to discharge, I also consent to the administration of blood and/or blood products.  Further, I understand that despite careful testing and screening of blood or blood products by collecting agencies, I may still be subject to ill effects as a result of receiving a blood transfusion and/or blood products.  The following are  some, but not all, of the potential risks that can occur: fever and allergic reactions, hemolytic reactions, transmission of diseases such as Hepatitis, AIDS and Cytomegalovirus (CMV) and fluid overload.  In the event that I wish to have an autologous transfusion of my own blood, or a directed donor transfusion, I will discuss this with my physician.  Check only if Refusing Blood or Blood Products  I understand refusal of blood or blood products as deemed necessary by my physician may have serious consequences to my condition to include possible death. I hereby assume responsibility for my refusal and release the hospital, its personnel, and my physicians from any responsibility for the consequences of my refusal.          o  Refuse      5.   I authorize the use of any specimen, organs, tissues, body parts or foreign objects that may be removed from my body during the operation/procedure for diagnosis, research or teaching purposes and their subsequent disposal by hospital authorities.  I also authorize the release of specimen test results and/or written reports to my treating physician on the hospital medical staff or other referring or consulting physicians involved in my care, at the discretion of the Pathologist or my treating physician.    6.   I consent to the photographing or videotaping of the operations or procedures to be performed, including appropriate portions of my body for medical, scientific, or educational purposes, provided my identity is not revealed by the pictures or by descriptive texts accompanying them.  If the procedure has been photographed/videotaped, the surgeon will obtain the original picture, image, videotape or CD.  The hospital will not be responsible for storage, release or maintenance of the picture, image, tape or CD.    7.   I consent to the presence of a  or observers in the operating room as deemed necessary by my physician or their designees.    8.   I  recognize that in the event my procedure results in extended X-Ray/fluoroscopy time, I may develop a skin reaction.    9. If I have a Do Not Attempt Resuscitation (DNAR) order in place, that status will be suspended while in the operating room, procedural suite, and during the recovery period unless otherwise explicitly stated by me (or a person authorized to consent on my behalf). The surgeon or my attending physician will determine when the applicable recovery period ends for purposes of reinstating the DNAR order.  10. Patients having a sterilization procedure: I understand that if the procedure is successful the results will be permanent and it will therefore be impossible for me to inseminate, conceive, or bear children.  I also understand that the procedure is intended to result in sterility, although the result has not been guaranteed.   11. I acknowledge that my physician has explained sedation/analgesia administration to me including the risk and benefits I consent to the administration of sedation/analgesia as may be necessary or desirable in the judgment of my physician.    I CERTIFY THAT I HAVE READ AND FULLY UNDERSTAND THE ABOVE CONSENT TO OPERATION and/or OTHER PROCEDURE.    _________________________________________  __________________________________  Signature of Patient     Signature of Responsible Person         ___________________________________         Printed Name of Responsible Person           _________________________________                 Relationship to Patient  _________________________________________  ______________________________  Signature of Witness          Date  Time      Patient Name: Ramsey Sapp     : 1955                 Printed: May 8, 2024     Medical Record #: [id number]                     Page 1 of [address] [place] [address] [place]  [address]    Consent for Anesthesia    I, Ramsey Sapp agree to be  cared for by an anesthesiologist, who is specially trained to monitor me and give me medicine to put me to sleep or keep me comfortable during my procedure    I understand that my anesthesiologist is not an employee or agent of [facility] or [employer]. He or she works for [employer].    As the patient asking for anesthesia services, I agree to:  Allow the anesthesiologist (anesthesia doctor) to give me medicine and do additional procedures as necessary. Some examples are: Starting or using an “IV” to give me medicine, fluids or blood during my procedure, and having a breathing tube placed to help me breathe when I’m asleep (intubation). In the event that my heart stops working properly, I understand that my anesthesiologist will make every effort to sustain my life, unless otherwise directed by [facility] Do Not Resuscitate documents.  Tell my anesthesia doctor before my procedure:  If I am pregnant.  The last time that I ate or drank.  All of the medicines I take (including prescriptions, herbal supplements, and pills I can buy without a prescription (including street drugs/illegal medications). Failure to inform my anesthesiologist about these medicines may increase my risk of anesthetic complications.  If I am allergic to anything or have had a reaction to anesthesia before.  I understand how the anesthesia medicine will help me (benefits).  I understand that with any type of anesthesia medicine there are risks:  The most common risks are: nausea, vomiting, sore throat, muscle soreness, damage to my eyes, mouth, or teeth (from breathing tube placement).  Rare risks include: remembering what happened during my procedure, allergic reactions to medications, injury to my airway, heart, lungs, vision, nerves, or muscles and in extremely rare instances death.  My doctor has explained to me other choices available to me for my care (alternatives).  Pregnant Patients  (“epidural”):  I understand that the risks of having an epidural (medicine given into my back to help control pain during labor), include itching, low blood pressure, difficulty urinating, headache or slowing of the baby’s heart. Very rare risks include infection, bleeding, seizure, irregular heart rhythms and nerve injury.  Regional Anesthesia (“spinal”, “epidural”, & “nerve blocks”):  I understand that rare but potential complications include headache, bleeding, infection, seizure, irregular heart rhythms, and nerve injury.    I can change my mind about having anesthesia services at any time before I get the medicine.    _____________________________________________________________________________  Patient (or Representative) Signature/Relationship to Patient  Date   Time    _____________________________________________________________________________   Name (if used)    Language/Organization   Time    _____________________________________________________________________________  Anesthesiologist Signature     Date   Time  I have discussed the procedure and information above with the patient (or patient’s representative) and answered their questions. The patient or their representative has agreed to have anesthesia services.    _____________________________________________________________________________  Witness        Date   Time  I have verified that the signature is that of the patient or patient’s representative, and that it was signed before the procedure  Patient Name: Ramsey Sapp     : 1955                 Printed: May 8, 2024     Medical Record #: [id number]                     Page 2 of 2